# Patient Record
Sex: MALE | Race: WHITE
[De-identification: names, ages, dates, MRNs, and addresses within clinical notes are randomized per-mention and may not be internally consistent; named-entity substitution may affect disease eponyms.]

---

## 2017-11-03 ENCOUNTER — HOSPITAL ENCOUNTER (EMERGENCY)
Dept: HOSPITAL 1 - ED | Age: 47
Discharge: HOME | End: 2017-11-03
Payer: COMMERCIAL

## 2017-11-03 VITALS — SYSTOLIC BLOOD PRESSURE: 138 MMHG | DIASTOLIC BLOOD PRESSURE: 66 MMHG

## 2017-11-03 VITALS — HEIGHT: 66 IN | WEIGHT: 202.98 LBS | BODY MASS INDEX: 32.62 KG/M2

## 2017-11-03 DIAGNOSIS — Z71.6: ICD-10-CM

## 2017-11-03 DIAGNOSIS — N18.6: ICD-10-CM

## 2017-11-03 DIAGNOSIS — B34.9: Primary | ICD-10-CM

## 2017-11-03 DIAGNOSIS — E87.5: ICD-10-CM

## 2017-11-03 DIAGNOSIS — Z88.1: ICD-10-CM

## 2017-11-03 DIAGNOSIS — F17.210: ICD-10-CM

## 2017-11-03 DIAGNOSIS — D64.9: ICD-10-CM

## 2017-11-03 DIAGNOSIS — I12.0: ICD-10-CM

## 2017-11-03 LAB
ALBUMIN SERPL-MCNC: 4 G/DL (ref 3.4–5)
ALP SERPL-CCNC: 117 U/L (ref 46–116)
ALT SERPL-CCNC: 23 U/L (ref 16–63)
AST SERPL-CCNC: 9 U/L (ref 15–37)
BASOPHILS NFR BLD: 0.4 % (ref 0–2)
BILIRUB SERPL-MCNC: 0.7 MG/DL (ref 0.2–1)
BUN SERPL-MCNC: 75 MG/DL (ref 7–18)
CALCIUM SERPL-MCNC: 8.5 MG/DL (ref 8.5–10.1)
CHLORIDE SERPL-SCNC: 90 MMOL/L (ref 98–107)
CO2 SERPL-SCNC: 19.1 MMOL/L (ref 21–32)
CREAT SERPL-MCNC: 16.6 MG/DL (ref 0.7–1.3)
ERYTHROCYTE [DISTWIDTH] IN BLOOD BY AUTOMATED COUNT: 13.4 % (ref 11.5–14.5)
GFR SERPLBLD BASED ON 1.73 SQ M-ARVRAT: 3 ML/MIN
GLUCOSE SERPL-MCNC: 91 MG/DL (ref 74–106)
PLATELET # BLD: 192 X10^3MCL (ref 130–400)
POTASSIUM SERPL-SCNC: 5.8 MMOL/L (ref 3.5–5.1)
PROT SERPL-MCNC: 9 G/DL (ref 6.4–8.2)
SODIUM SERPL-SCNC: 129 MMOL/L (ref 136–145)

## 2019-03-29 ENCOUNTER — HOSPITAL ENCOUNTER (EMERGENCY)
Dept: HOSPITAL 1 - ED | Age: 49
Discharge: HOME | End: 2019-03-29
Payer: COMMERCIAL

## 2019-03-29 VITALS
WEIGHT: 199 LBS | BODY MASS INDEX: 31.23 KG/M2 | WEIGHT: 199 LBS | BODY MASS INDEX: 31.23 KG/M2 | HEIGHT: 67 IN | HEIGHT: 67 IN

## 2019-03-29 VITALS — DIASTOLIC BLOOD PRESSURE: 93 MMHG | SYSTOLIC BLOOD PRESSURE: 158 MMHG

## 2019-03-29 DIAGNOSIS — J06.9: Primary | ICD-10-CM

## 2019-03-29 DIAGNOSIS — Z88.1: ICD-10-CM

## 2019-03-29 DIAGNOSIS — J40: ICD-10-CM

## 2019-03-29 DIAGNOSIS — N18.9: ICD-10-CM

## 2019-03-29 DIAGNOSIS — I12.9: ICD-10-CM

## 2019-03-30 ENCOUNTER — HOSPITAL ENCOUNTER (INPATIENT)
Dept: HOSPITAL 1 - ED | Age: 49
LOS: 11 days | Discharge: HOME HEALTH | DRG: 720 | End: 2019-04-10
Attending: FAMILY MEDICINE | Admitting: FAMILY MEDICINE
Payer: COMMERCIAL

## 2019-03-30 VITALS
HEIGHT: 66 IN | HEIGHT: 66 IN | BODY MASS INDEX: 31.53 KG/M2 | BODY MASS INDEX: 31.53 KG/M2 | WEIGHT: 196.21 LBS | WEIGHT: 196.21 LBS

## 2019-03-30 DIAGNOSIS — N18.6: ICD-10-CM

## 2019-03-30 DIAGNOSIS — E83.39: ICD-10-CM

## 2019-03-30 DIAGNOSIS — Z83.3: ICD-10-CM

## 2019-03-30 DIAGNOSIS — Z80.3: ICD-10-CM

## 2019-03-30 DIAGNOSIS — E87.5: ICD-10-CM

## 2019-03-30 DIAGNOSIS — A08.4: ICD-10-CM

## 2019-03-30 DIAGNOSIS — Z82.3: ICD-10-CM

## 2019-03-30 DIAGNOSIS — Z79.899: ICD-10-CM

## 2019-03-30 DIAGNOSIS — I13.11: ICD-10-CM

## 2019-03-30 DIAGNOSIS — E87.1: ICD-10-CM

## 2019-03-30 DIAGNOSIS — A41.81: Primary | ICD-10-CM

## 2019-03-30 DIAGNOSIS — D63.1: ICD-10-CM

## 2019-03-30 DIAGNOSIS — N17.0: ICD-10-CM

## 2019-03-30 DIAGNOSIS — I12.0: ICD-10-CM

## 2019-03-30 DIAGNOSIS — E87.8: ICD-10-CM

## 2019-03-30 DIAGNOSIS — Z99.2: ICD-10-CM

## 2019-03-30 DIAGNOSIS — J20.8: ICD-10-CM

## 2019-03-30 DIAGNOSIS — Z88.8: ICD-10-CM

## 2019-03-30 DIAGNOSIS — I16.0: ICD-10-CM

## 2019-03-30 LAB
ALBUMIN SERPL-MCNC: 3.3 G/DL (ref 3.4–5)
ALP SERPL-CCNC: 95 U/L (ref 46–116)
ALT SERPL-CCNC: 34 U/L (ref 16–63)
AST SERPL-CCNC: 20 U/L (ref 15–37)
BASOPHILS NFR BLD: 1.8 % (ref 0–2)
BILIRUB SERPL-MCNC: 0.48 MG/DL (ref 0.2–1)
BUN SERPL-MCNC: 65 MG/DL (ref 7–18)
CALCIUM SERPL-MCNC: 8.9 MG/DL (ref 8.5–10.1)
CHLORIDE SERPL-SCNC: 95 MMOL/L (ref 98–107)
CO2 SERPL-SCNC: 28.6 MMOL/L (ref 21–32)
CREAT SERPL-MCNC: 14.2 MG/DL (ref 0.7–1.3)
ERYTHROCYTE [DISTWIDTH] IN BLOOD BY AUTOMATED COUNT: 15.2 % (ref 11.5–14.5)
GFR SERPLBLD BASED ON 1.73 SQ M-ARVRAT: 4 ML/MIN
GLUCOSE SERPL-MCNC: 106 MG/DL (ref 74–106)
PLATELET # BLD: 186 X10^3MCL (ref 130–400)
POTASSIUM SERPL-SCNC: 5 MMOL/L (ref 3.5–5.1)
PROT SERPL-MCNC: 7.5 G/DL (ref 6.4–8.2)
SODIUM SERPL-SCNC: 134 MMOL/L (ref 136–145)

## 2019-03-30 PROCEDURE — P9016 RBC LEUKOCYTES REDUCED: HCPCS

## 2019-03-30 NOTE — NUR
PT AWAKE AND ALERT. PT C/O COUGH, FEVER, N/V/D. PT ON FULL CM. NAD. RESP E/U.
PT HAS HX OF HTN, AND KIDNEY FAILURE. MSE COMPLETED BY DR BAILEY

## 2019-03-31 VITALS — SYSTOLIC BLOOD PRESSURE: 140 MMHG | DIASTOLIC BLOOD PRESSURE: 75 MMHG

## 2019-03-31 VITALS — DIASTOLIC BLOOD PRESSURE: 78 MMHG | SYSTOLIC BLOOD PRESSURE: 124 MMHG

## 2019-03-31 VITALS — DIASTOLIC BLOOD PRESSURE: 94 MMHG | SYSTOLIC BLOOD PRESSURE: 157 MMHG

## 2019-03-31 VITALS — DIASTOLIC BLOOD PRESSURE: 98 MMHG | SYSTOLIC BLOOD PRESSURE: 157 MMHG

## 2019-03-31 VITALS — SYSTOLIC BLOOD PRESSURE: 149 MMHG | DIASTOLIC BLOOD PRESSURE: 72 MMHG

## 2019-03-31 VITALS — DIASTOLIC BLOOD PRESSURE: 71 MMHG | SYSTOLIC BLOOD PRESSURE: 112 MMHG

## 2019-03-31 VITALS — SYSTOLIC BLOOD PRESSURE: 151 MMHG | DIASTOLIC BLOOD PRESSURE: 90 MMHG

## 2019-03-31 LAB
AMYLASE SERPL-CCNC: 144 U/L (ref 25–115)
BASOPHILS NFR BLD: 0 % (ref 0–2)
BUN SERPL-MCNC: 72 MG/DL (ref 7–18)
CALCIUM SERPL-MCNC: 8.6 MG/DL (ref 8.5–10.1)
CHLORIDE SERPL-SCNC: 95 MMOL/L (ref 98–107)
CHOLEST SERPL-MCNC: 140 MG/DL (ref ?–200)
CHOLEST/HDLC SERPL: 3 MG/DL
CO2 SERPL-SCNC: 26.2 MMOL/L (ref 21–32)
CREAT SERPL-MCNC: 15.9 MG/DL (ref 0.7–1.3)
ERYTHROCYTE [DISTWIDTH] IN BLOOD BY AUTOMATED COUNT: 15.4 % (ref 11.5–14.5)
GFR SERPLBLD BASED ON 1.73 SQ M-ARVRAT: 4 ML/MIN
GLUCOSE SERPL-MCNC: 89 MG/DL (ref 74–106)
HDLC SERPL-MCNC: 47 MG/DL (ref 40–60)
LIPASE SERPL-CCNC: 319 IU/L (ref 73–393)
MAGNESIUM SERPL-MCNC: 2 MG/DL (ref 1.8–2.4)
MAGNESIUM SERPL-MCNC: 2.1 MG/DL (ref 1.8–2.4)
MONOCYTES NFR BLD: 5 % (ref 0–7)
NEUTS BAND NFR BLD: 2 % (ref 0–10)
NEUTS SEG NFR BLD MANUAL: 93 % (ref 37–75)
PHOSPHATE SERPL-MCNC: 4.7 MG/DL (ref 2.5–4.9)
PHOSPHATE SERPL-MCNC: 6.9 MG/DL (ref 2.5–4.9)
PLAT MORPH BLD: (no result)
PLATELET # BLD: 153 X10^3MCL (ref 130–400)
POTASSIUM SERPL-SCNC: 5.6 MMOL/L (ref 3.5–5.1)
RBC MORPH BLD: (no result)
SODIUM SERPL-SCNC: 134 MMOL/L (ref 136–145)
T3 SERPL-MCNC: 0.6 NG/ML
T3RU NFR SERPL: 33 % UPTAKE (ref 33–39)
T4 FREE SERPL-MCNC: 1.13 NG/DL (ref 0.76–1.46)
T4 SERPL-MCNC: 9.4 UG/DL (ref 4.7–13.3)
T4/T3 UPTAKE INDEX SERPL: 3.1 UG/DL (ref 1.4–4.5)
TRIGL SERPL-MCNC: 87 MG/DL (ref ?–150)

## 2019-03-31 PROCEDURE — 5A1D70Z PERFORMANCE OF URINARY FILTRATION, INTERMITTENT, LESS THAN 6 HOURS PER DAY: ICD-10-PCS | Performed by: FAMILY MEDICINE

## 2019-03-31 NOTE — NUR
PT REMAINS ASLEEP AND EASILY AROUSABLE. HE HAD NO C/O PAIN. NO EPISODE OF N/V.
NO BM NOTED . HL TO RTAC INTACT.

## 2019-03-31 NOTE — NUR
PT DONE WITH HEMODIALYSIS. 2500CC OUT. DRESSING TO L ARM CDI. VS T 99.7, BP
132/77, , RR 20. WILL CONTINUE TO MONITOR.

## 2019-03-31 NOTE — NUR
PAGE GATED THERSIDEN FOR COUGH MEDICAITON. PATIENT HAS BEEN COUGHING JUUP
BLOOD SUPTUM BUT THE LABS REFUSED THE SPECIMEN SENT AND THERE IS TOO MUCH
SALIVA. WILL ATTEMPT AGAIN AND OFFERED THE PATIENT ANOTHER SPECIMEN CUP.

## 2019-03-31 NOTE — NUR
SEEN BY DR BENOIT AND ADVISED OF NEEDING A SPUTUM AND THAT CLIFTON CULTURES ARE
STILL NOT CONCLUSIVE. LAB CALLED WITH RESULT AND CALLED THE RESIDENT WIT THE
RSULTS. SENSITIVITY PENDING AT THIS TIME. NTOE DTHE PATEINT HAS BEEN HAVING
FEVER AND THE INFLUENZA CAME BACK NEGATIVE AT THIS TIME. FAMILY AT BEDSIDE AND
ATTENTIVE WT[facility] CARE.

## 2019-03-31 NOTE — NUR
PATIENT HAS BEEN WITH SATURATION AT 92 PERCENT AND PLACED ON THREE LITERS
HE STATES HE HAS A BAD HEAD ACHE. OFFERE NORCO AS THE TYLNOEL AS NOT
EFECTIVE. WITH CONTINUE TO MONITOR.

## 2019-03-31 NOTE — NUR
PATEINT GIVEN TYLENOL AND THE TAMIFLU AS INDICATED.PATEINT HAD A TEMPERATURE.
WILL MONITO FOR THE EFFECTIVENESS OF THE MEDICATION.

## 2019-03-31 NOTE — NUR
PATIEN TRECEIVED FROM PREVIOUS SHIFT ALERT AND ORIENTD BUT APPEARS VERY TIRED
AND WITH GENERAL WEAKNESS. PATIENT HAS A AV SHUNT TO [facility] LEFT FOREARM AND
DRESSING INTACT AND DRY. LAST HD WAS ON THURDAY THIS WEEK. PATIENT WITH A DRY
COUGH AND HAS BEEN ON BEDREST AND HAS BEEN ABLE TO TOERATE THE DIET BUT HAS A
POOR APPEATITE DUE TO HIS PRESENT ILLNESS. PATIENT STATES HE DOES HD IN HIS
HOME AND TO HIMSELF. HE DOES NOT DO PARATINEAL BUT THE HEMO DIALYSIS AND HAS A
MACHINE AT HOME. PATIENT HAS HAD DIARRHEA AND REFUSED THE COLACE THIS AM.
PATIENT CHEST XRAYSHOWS PATEIT WITH PULMONARY VASCULAR CONGSTIO AND HE HAS
BEEN ON AND OFF NAUSEA AND RECEIVED ZOFRAN AS INDICATED. VITALS AT THIS AM AT
98.3, 87, 18, 151/90, 97%. NOTED LABS ARE THE HEMOGLOBIN AT 8.8, WBC AS T12.4,
AND THE NEUTRAPHILS AT 89.1. PATIENT WITH HISTORY OF HTN AND RENAL FAILURE AND
HAS BEE ON FULL LIGUID DIET BUT STATES HE HAS HAD A POOR DIET INTAKE. LUNGA
ARE WITH SOME RALES AND HE HAS DIMINIHSED BREATH SOUNDS. BOWEL SOUNDS ACTIVE
AND RECIEVED ZOFRAN LAST SHIFT FOR COMPLAINS OF NAUSEA AND VOMITIING. DENIES
ANY ACUTE PAIN AT THIS TIME. WILL CONTINUE TO MONITOR AS INDICATED.

## 2019-04-01 VITALS — SYSTOLIC BLOOD PRESSURE: 111 MMHG | DIASTOLIC BLOOD PRESSURE: 57 MMHG

## 2019-04-01 VITALS — SYSTOLIC BLOOD PRESSURE: 120 MMHG | DIASTOLIC BLOOD PRESSURE: 65 MMHG

## 2019-04-01 VITALS — SYSTOLIC BLOOD PRESSURE: 105 MMHG | DIASTOLIC BLOOD PRESSURE: 66 MMHG

## 2019-04-01 VITALS — DIASTOLIC BLOOD PRESSURE: 70 MMHG | SYSTOLIC BLOOD PRESSURE: 128 MMHG

## 2019-04-01 LAB
BASOPHILS NFR BLD: 0.3 % (ref 0–2)
BUN SERPL-MCNC: 46 MG/DL (ref 7–18)
CALCIUM SERPL-MCNC: 8.9 MG/DL (ref 8.5–10.1)
CHLORIDE SERPL-SCNC: 98 MMOL/L (ref 98–107)
CO2 SERPL-SCNC: 26.8 MMOL/L (ref 21–32)
CREAT SERPL-MCNC: 11.3 MG/DL (ref 0.7–1.3)
ERYTHROCYTE [DISTWIDTH] IN BLOOD BY AUTOMATED COUNT: 15.2 % (ref 11.5–14.5)
GFR SERPLBLD BASED ON 1.73 SQ M-ARVRAT: 5 ML/MIN
GLUCOSE SERPL-MCNC: 103 MG/DL (ref 74–106)
IRON SERPL-MCNC: 24 UG/DL (ref 65–170)
MAGNESIUM SERPL-MCNC: 2 MG/DL (ref 1.8–2.4)
PHOSPHATE SERPL-MCNC: 6.6 MG/DL (ref 2.5–4.9)
PLATELET # BLD: 155 X10^3MCL (ref 130–400)
POTASSIUM SERPL-SCNC: 5.8 MMOL/L (ref 3.5–5.1)
SODIUM SERPL-SCNC: 135 MMOL/L (ref 136–145)
TIBC SERPL-MCNC: 169 UG/DL (ref 250–450)

## 2019-04-01 PROCEDURE — 5A1D70Z PERFORMANCE OF URINARY FILTRATION, INTERMITTENT, LESS THAN 6 HOURS PER DAY: ICD-10-PCS | Performed by: FAMILY MEDICINE

## 2019-04-01 NOTE — NUR
DUE MEDS GIVEN. B/P MED HYDRALAZINE HELD DUE TO PT REFUSAL AND SBP IN THE
LOWER RANGE . DAUGHTER AT BEDSIDE. CALL LIGHT WITHIN REACH.

## 2019-04-01 NOTE — NUR
PT IS AAOX4. RESP EVEN AND UNLABORED. ON O2 N/C AT 3 LPM. PT HAS SOB UPON
EXERTION. TELE 21 IN PLACE READING NSR. IV CATH TO RAC N/S LOCKED. SITE WNL.
PT DENIES PAIN OR DISCOMFORT. CALL LIGHT WITHIN REACH. BED IN LOW POSITION.
WILL ENDORSE ALL CARE TO NOC RN.

## 2019-04-01 NOTE — NUR
REPORTED TO DR. MCKENZIE PT'S K+ =5.8. RECEIVED ORDER TO D/C DIET, NEW DIET
ORDER FULL LIQUID RENAL DIET. ORDERS NOTED AND CARRIED OUT.

## 2019-04-01 NOTE — NUR
PT AGREED TO TAKE B/P MED METOPROLOL TO MAINTAIN HEART RATE. ALL OTHER MEDS
NIFEDIPINE, BENAZAPRIL, LOSARTAN, HYDRALAZINE WILL BE HELD. COLACE HELD DUE TO
REPORTS OF LOOSE STOOLS. B/P 11/57, HR 97. NO DISTREESS NOTED. CALL LIGHT
WITHIN REACH.

## 2019-04-01 NOTE — NUR
DUE MEDS GIVEN. PT REFUSED TO TAKE ALL B/P MEDS. STATES HE IS JUST POST
DIALYSIS LAST NIGHT (3/31/19) AND STATE HE WILL BE DIZZY ALL DAY IF HE TAKES
HIS B/P MEDS TODAY. PT REFUSED PAIN OR DISCOMFORT. RESP EVEN AND UNLABORED. NO
DISTRESS NOTED. CALL LIGHT WITHIN REACH.

## 2019-04-01 NOTE — NUR
RECEIVED PT LAYING IN BED, NO ACUTE DISTRESS OBSERVED. DENIES PAIN OR
DISCOMFORT AT THIS TIME. AA/OX4, ABLE TO MAKE NEEDS KNOWN, SPEECH CLEAR AND
APPROPRIATE. SINUS TACH TO TELE #21, , DENIES CP. PULSES PRESENT AND
EQUAL THROUGHOUT, NO EDEMA NOTED. BREATHING ON 3LNC, EVEN AND UNLABORED,
DENIES SOB OR DYSPNEA AT THIS TIME, SOB WITH EXERTION, LUNGS DIM TO BASES, O2
SAT 93% ABD ROUND AND SOFT, NONTENDER, WITH ACTIVE BOWEL SOUNDS, DENIES N/V/D.
ANURIC, HD EARLIER TODAY WITH 2L OUT, AV SHUNT TO LFA, DRESSING CDI.
GENERALIZED WEAKNESS NOTED, AMBULATORY AND ABLE TO REPOSITION SELF IN BED.
SKIN CDI. IV TO RAC LEAKING, WILL START NEW IV SITE, CASE MANAGEMENT AT
BEDSIDE TO SEE PT AT THIS TIME. COMFORT AND SAFETY MEASURES IN PLACE. ALL
NEEDS ASSESSED AND ATTENDED TO. CALL LIGHT WITHIN REACH. WILL CONTINUE TO
MONITOR

## 2019-04-01 NOTE — NUR
PT IS AAOX4. DENIES H/A OF DIZZINESS. TELE 21 IN PLACE READING NSR. RESP EVEN
AND UNLABORED. PT NOTED WITH FINE CRACKLES BILATERAL UPPER LOBES. ON O2 N/C AT
3LPM. PT NOTED WITH NONPRODUCTIVE COUGH. ABDOMEN SOFT, NONTENDER,
NONDISTENDED. BOWEL SOUNDS ACTIVE. SKIN CDI. PT HA AV FISTULA TO LFA, BRUIT
AND THRILL PRESENT. IV CATH TO RAC N/S LOCKED. CATH FLUSHED AND PATENT, NO S/S
OF INFECTION OR INFILTRATION AT SITE. PT DENIES PAIN OR DISCOMFORT AT THIS
TIME. CALL LIGHT WITHIN REACH. BED IN LOW POSITION.

## 2019-04-01 NOTE — NUR
PT RESTING IN BED. NO PAIN NOTED THROUGHOUT SHIFT. FREQUENT CHECKS MADE ON PT
TO ASSURE NC IS IN PLACE. BREATHING SHALLOW NUT PT STATES NO SOB. PT REMAINS
ON NC AT 3L. PT WAS GIVEN TYLENOL FOR TEMP X 1. TEMP NOW 97.7. IV SITE TO R AC
INTACT, SALINE LOCK. ALL NEEDS MET AND ANTICIPATED. BED IN LOW POSITION. CALL
LIGHT WITHIN REACH. WILL ENDORSE TO ONCOMING RN.

## 2019-04-02 VITALS — DIASTOLIC BLOOD PRESSURE: 62 MMHG | SYSTOLIC BLOOD PRESSURE: 135 MMHG

## 2019-04-02 VITALS — DIASTOLIC BLOOD PRESSURE: 84 MMHG | SYSTOLIC BLOOD PRESSURE: 149 MMHG

## 2019-04-02 VITALS — DIASTOLIC BLOOD PRESSURE: 77 MMHG | SYSTOLIC BLOOD PRESSURE: 145 MMHG

## 2019-04-02 VITALS — SYSTOLIC BLOOD PRESSURE: 159 MMHG | DIASTOLIC BLOOD PRESSURE: 85 MMHG

## 2019-04-02 VITALS — SYSTOLIC BLOOD PRESSURE: 135 MMHG | DIASTOLIC BLOOD PRESSURE: 62 MMHG

## 2019-04-02 VITALS — SYSTOLIC BLOOD PRESSURE: 121 MMHG | DIASTOLIC BLOOD PRESSURE: 65 MMHG

## 2019-04-02 LAB
BUN SERPL-MCNC: 32 MG/DL (ref 7–18)
CALCIUM SERPL-MCNC: 9.1 MG/DL (ref 8.5–10.1)
CHLORIDE SERPL-SCNC: 97 MMOL/L (ref 98–107)
CO2 SERPL-SCNC: 30.8 MMOL/L (ref 21–32)
CREAT SERPL-MCNC: 8.9 MG/DL (ref 0.7–1.3)
GFR SERPLBLD BASED ON 1.73 SQ M-ARVRAT: 7 ML/MIN
GLUCOSE SERPL-MCNC: 91 MG/DL (ref 74–106)
POTASSIUM SERPL-SCNC: 3.8 MMOL/L (ref 3.5–5.1)
SODIUM SERPL-SCNC: 136 MMOL/L (ref 136–145)

## 2019-04-02 NOTE — NUR
PT'S ORAL TEMP 100.4, COOLING MEASURES INITIATED, BLANKETS OFF, ICE
PACKS APPLIED, AC ON. TYLENOL PRN PO GIVEN PER EMAR.

## 2019-04-02 NOTE — NUR
TEMP REASSESSED, 99.0. NO OTHER SIGNIFICANT CHANGES TO REPORT. PT COMPLIED
WITH NURSING CARE THROUGHOUT THE SHIFT. NO DISTRESS OBSERVED AT THIS TIME, PT
LAYING IN BED, BREATHING EVEN AND UNLABORED. COMFORT AND SAFETY MEASURES
MAINTAINED. ALL NEEDS ASSESSED AND ATTENDED TO. CALL LIGHT WITHIN REACH. WILL
CONTINUE TO MONITOR AND ENDORSE CARE TO DAY SHIFT NURSE

## 2019-04-02 NOTE — NUR
SEEN THE PT AND SPOKE WITH HIM. AWARE ABOUT CHEST XRAY RESULT, SHE
SAID SHE WILL ORDER CT CHEST.  AWARE ABOUT PT IS COUGHING OUT
BLOOD TINGED SPUTUM. PT ON RT PROTOCOLE.

## 2019-04-02 NOTE — NUR
PT RESTING IN BED COMFORTABLY. DENIES PAIN THIS TIME. STABLE. STILL COUGHING
BLOOD TINGED SPUTUM. DOCTORS AWARE. GAVE REPORT TO NIGHT SHIFT NURSE.

## 2019-04-02 NOTE — NUR
REMINDED PT MULTIPLE TIME THORUGH OUT THE SHIFT ABOUT RECOLLECTING THE SPUTUM.
PT AGREED BUT NOT GIVING SAMPLE. WILL ENDORSE TO NIGHT SHIFT.

## 2019-04-02 NOTE — NUR
DR. FELIZ AT BEDSIDE TO SEE PT. PER DR. FELIZ, CONTINUE ORDERED ABX, MAKE
SURE IV ABX ARE NOT DIALYZED OUT WHEN PT UNDERGOES DIALYSIS. DR. FELIZ ALSO
RECOMMENDS JERILYN, STATES HE WILL SPEAK WITH RESIDENT. DR. AUGUSTINE BAIRD AT THIS
TIME, WILL ANTICIPATE CALL BACK. DISCUSSED PLAN OF CARE WITH DR. FELIZ.
UPDATED ON PT'S STATUS

## 2019-04-02 NOTE — NUR
PT IS STABLE, NO SOB NOTED. PT SAID HE DOESNOT LIKE NASAL CANNULA. REMOVE O2
AND CHECKED SPO2 ON RA AND PT SATTING 96% IN RA AND NO SOB. KEEPING PT ON RA.
INSTRUCTED PT IF HE FEEL DIFFUCULTY BREATHING PLEASE INFORM AND CHECKING PT
SPO2 ON ROUTINE. INFORMED PT NEED TO COLLECT SPUTUM AGAIN AND PROVIDED SPUTUM
CUP AND INSTRUCTED HIM HOW TO COLLECT. PT AGREED.

## 2019-04-02 NOTE — NUR
RECEIVED PT LAYING IN BED, NO ACUTE DISTRESS OBSERVED. DENIES PAIN OR
DISCOMFORT AT THIS TIME. AA/OX4, ABLE TO MAKE NEEDS KNOWN, SPEECH CLEAR AND
APPROPRIATE. SINUS TACH TO TELE #21, , DENIES CP. PULSES PRESENT AND
EQUAL THROUGHOUT, NO EDEMA NOTED. HEMOPTYSIS WITH BLOOD TINGED SPUTUM,
PRODUCTIVE COUGH. BREATHING ON RA, EVEN AND UNLABORED, DENIES SOB OR DYSPNEA
AT THIS TIME, SOB WITH EXERTION, CRACKLES TO UPPER LOBES, LUNGS DIM TO BASES,
O2 SAT 96% ABD ROUND AND SOFT, NONTENDER, WITH ACTIVE BOWEL SOUNDS, DENIES
N/V/D. ANURIC, HD YESTERDAY WITH 2L OUT, AV SHUNT TO LFA, DRESSING CDI. MILD
WEAKNESS, AMBULATORY AND ABLE TO REPOSITION SELF IN BED. SKIN CDI. IV TO MINDY
IN PLACE, S/L AT THIS TIME, NO PAIN, REDNESS OR SWELLING WHEN FLUSHED WITH NS.
COMFORT AND SAFETY MEASURES IN PLACE. ALL NEEDS ASSESSED AND ATTENDED TO. CALL
LIGHT WITHIN REACH. WILL CONTINUE TO MONITOR

## 2019-04-03 VITALS — SYSTOLIC BLOOD PRESSURE: 173 MMHG | DIASTOLIC BLOOD PRESSURE: 95 MMHG

## 2019-04-03 VITALS — DIASTOLIC BLOOD PRESSURE: 85 MMHG | SYSTOLIC BLOOD PRESSURE: 134 MMHG

## 2019-04-03 VITALS — DIASTOLIC BLOOD PRESSURE: 92 MMHG | SYSTOLIC BLOOD PRESSURE: 152 MMHG

## 2019-04-03 VITALS — SYSTOLIC BLOOD PRESSURE: 138 MMHG | DIASTOLIC BLOOD PRESSURE: 87 MMHG

## 2019-04-03 LAB
BASOPHILS NFR BLD: 0.2 % (ref 0–2)
BUN SERPL-MCNC: 47 MG/DL (ref 7–18)
CALCIUM SERPL-MCNC: 9.3 MG/DL (ref 8.5–10.1)
CHLORIDE SERPL-SCNC: 97 MMOL/L (ref 98–107)
CO2 SERPL-SCNC: 28.6 MMOL/L (ref 21–32)
CREAT SERPL-MCNC: 12 MG/DL (ref 0.7–1.3)
ERYTHROCYTE [DISTWIDTH] IN BLOOD BY AUTOMATED COUNT: 14.7 % (ref 11.5–14.5)
GFR SERPLBLD BASED ON 1.73 SQ M-ARVRAT: 5 ML/MIN
GLUCOSE SERPL-MCNC: 99 MG/DL (ref 74–106)
PLATELET # BLD: 186 X10^3MCL (ref 130–400)
POTASSIUM SERPL-SCNC: 3.9 MMOL/L (ref 3.5–5.1)
SODIUM SERPL-SCNC: 138 MMOL/L (ref 136–145)

## 2019-04-03 PROCEDURE — 5A1D70Z PERFORMANCE OF URINARY FILTRATION, INTERMITTENT, LESS THAN 6 HOURS PER DAY: ICD-10-PCS | Performed by: FAMILY MEDICINE

## 2019-04-03 NOTE — NUR
SHIFT REASEESSMENT DONE.PATIENT ALERT AND ORIENTED.FAMILY AT BEDSIDE
VISITING.AIRBORNE PRECAUTION MAINTAINED.O2 AT 3 L N/C.MILD GEN
WEAKNESS,PHYSICAL THERAPY BUT AMBUTAORY.HEPLOCK MINDY,AND RAC.IVATB AS
SCHEDULED.TELE 21 REMAINS ST.LFA AV SHUNT LFA,HAD HD TODAY 3 LITERS
OUT.PATIENT IS ANURIC.CALL LIGHT IN REACH.

## 2019-04-03 NOTE — NUR
AT 1715 - HEMODIALYSIS COMPLETED. TOTAL OF 3000 ML REMOVED. POST HD /78.
AT 1750 - PATIENT HAS EATEN FULL LIQUID DINNER. EPOGEN 8000 UNITS ADMINSITERED
POST DIALYSIS AS PER EMAR. PATIENT SITTING IN CHAIR. FAMILY AT BEDSIDE.

## 2019-04-03 NOTE — NUR
MONITOR SHOWING SINUS TACH; RATE 120. AFEBRILE. BP WNL. NO C/O PAIN.
NON-PRODUCTIVE COUGH. PATIENT AWARE OF NEED TO COLLECT SPUTUM FOR SPECIMEN.
WILL ENDORSE CARE TO NIGHT NURSE.

## 2019-04-03 NOTE — NUR
RECEIVED PT FROM SHIFT NURSE ASLEEP BUT AROUSABLE. NO ACUTE DISTRESS NOTED.
RESP EVEN AND UNLABORED ON 3L NC.  IV INTACT AND PATENT. BED IN LOW POSITION.
CALL LIGHT WITHIN REACH. WILL CONTINUE TO MONITOR.

## 2019-04-03 NOTE — NUR
AT 1145 - TOOK OVER CARE OF PATIENT. RECEIVED REPORT FROM CHARGE NURSE.
DIALYSIS NURSE IN ROOM PREPARING FOR HD. PATIENT ON AIRBORNE ISOLATION.
AT 1300 - HD NURSE REQUESTED THAT SCHEDULED DOSE OF HYDRALAZINE NOT BE GIVEN
DESPITE /95, BECAUSE OF HD AND PLAN TO REMOVE 3L.
AT 1410 - HD IN PROGRESS. PATIENT SLEEPING. DAUGHTER AT BEDSIDE.

## 2019-04-03 NOTE — NUR
PT'S TEMP REASSESSED, 99.1, COOLING MEASURES REMAIN IN PLACE. NO OTHER
SIGNIFICANT CHANGES TO REPORT, PT COMPLIED WITH NURSING CARE THROUGHOUT THE
SHIFT. PT LAYING IN BED AT THIS TIME, BREATHING EVEN AND UNLABORED, NO ACUTE
DISTRESS OBSERVED. COMFORT AND SAFETY MEASURES MAINTAINED. ALL NEEDS ASSESSED
AND ATTENDED TO. CALL LIGHT WITHIN REACH. WILL CONTINUE TO MONITOR AND ENDORSE
CARE TO DAY SHIFT NURSE

## 2019-04-03 NOTE — NUR
Initial Nutrition Assessment- 210 A RACHEAL ARMENDARIZ MR
 
 Dx: FEVER, SEPSIS
 PMHx: ESRD, HTN ON HOME DIALYSIS
 PSHx: Fistula left arm
 Labs: (4/3) CL 97L, BUN 47H, CREAT 12.0L, (4/1) PHOS 6.6H,
 Meds: Colace, cozaar, lactinex, renagel, Tamiflu, zofran
Diet: Full liquid
PO Intake: 100% breakfast
 Ht: 167.64 cms (66 in) Wt: 88.9 kg (195.5#) BMI:   31.6 kg/m2
IBW: 142# (65 KG) %IBW:  136 UBW: 88 kg
 Age: 48 /M
 Food Allergies: NKFA
 Skin: intact
Harmeet: 21
Edema: none
GI: Last BM 4/1/19
 
Per H&P, Pt is a readmit and has new onset nausea, vomiting, diarrhea. Pt does
his own dialysis every other day with left arm fistula. Pt is ambulatory, does
not use oxygen.
 
Pt visit, Pt was awake and alert and said that he had diarrhea since admission
but is improving, he has poor appetite but has no difficulty swallowing.
 
 Problem with:  N:  no   V: no     D: yes but improving    C: no
 Problems with: Chewing: no Swallowing:  no
 Current appetite: poor
Recent wt change: yes but unknown  %wt change: unknown
Vitamin/Supplement use: Vitamin D
Special diet at home: Tries to follow Renal diet
Physical activity: unknown
Education: patient aware about dietary restrictions in renal diet
 
 Estimated Nutritional Needs Based on Ideal  body weight 65  kg
 Energy: 1950 - 2275 kcal/d (30-35 kcal/kg- HD)
 Protein:  98- 130 g/d (1.5-2.0 g/kg)-maintenance and preservation of lean
body mass/ HD
 Fluid: 1950- 2300 ml/d  (1 ml/kcal-fluid balance) or per doctor
 
Nutrition Diagnosis
1. Inadequate oral intake related to medical condition as evidenced by
self-reported poor PO and patient on full liquid diet since 3/31/19.
 
Intervention
1. Progress to Renal diet when medically appropriate.
 
 
Monitor/Evaluate
 Goal: PO intake at least 75 % of estimated needs
 Monitor: PO intake, Labs, GI function
 F/U in 3-5 days as moderate risk : 4/6-4/8

## 2019-04-03 NOTE — NUR
PATIENT GIVEN PM MEDS,ALSO WANTING TYLENOL BUT HE SAYS IF THERE IS A STRONGER
PAIN MED HE WILL TAKE IT.WILL GIVE NORCO.

## 2019-04-04 VITALS — DIASTOLIC BLOOD PRESSURE: 88 MMHG | SYSTOLIC BLOOD PRESSURE: 136 MMHG

## 2019-04-04 VITALS — DIASTOLIC BLOOD PRESSURE: 72 MMHG | SYSTOLIC BLOOD PRESSURE: 143 MMHG

## 2019-04-04 VITALS — DIASTOLIC BLOOD PRESSURE: 79 MMHG | SYSTOLIC BLOOD PRESSURE: 159 MMHG

## 2019-04-04 VITALS — SYSTOLIC BLOOD PRESSURE: 133 MMHG | DIASTOLIC BLOOD PRESSURE: 79 MMHG

## 2019-04-04 VITALS — DIASTOLIC BLOOD PRESSURE: 69 MMHG | SYSTOLIC BLOOD PRESSURE: 136 MMHG

## 2019-04-04 VITALS — SYSTOLIC BLOOD PRESSURE: 118 MMHG | DIASTOLIC BLOOD PRESSURE: 71 MMHG

## 2019-04-04 VITALS — DIASTOLIC BLOOD PRESSURE: 71 MMHG | SYSTOLIC BLOOD PRESSURE: 131 MMHG

## 2019-04-04 VITALS — SYSTOLIC BLOOD PRESSURE: 117 MMHG | DIASTOLIC BLOOD PRESSURE: 78 MMHG

## 2019-04-04 VITALS — SYSTOLIC BLOOD PRESSURE: 127 MMHG | DIASTOLIC BLOOD PRESSURE: 87 MMHG

## 2019-04-04 VITALS — DIASTOLIC BLOOD PRESSURE: 86 MMHG | SYSTOLIC BLOOD PRESSURE: 139 MMHG

## 2019-04-04 VITALS — DIASTOLIC BLOOD PRESSURE: 82 MMHG | SYSTOLIC BLOOD PRESSURE: 139 MMHG

## 2019-04-04 LAB
BASOPHILS NFR BLD: 0.5 % (ref 0–2)
BASOPHILS NFR BLD: 0.9 % (ref 0–2)
BUN SERPL-MCNC: 31 MG/DL (ref 7–18)
CALCIUM SERPL-MCNC: 9.6 MG/DL (ref 8.5–10.1)
CHLORIDE SERPL-SCNC: 98 MMOL/L (ref 98–107)
CO2 SERPL-SCNC: 28.1 MMOL/L (ref 21–32)
CREAT SERPL-MCNC: 8.3 MG/DL (ref 0.7–1.3)
ERYTHROCYTE [DISTWIDTH] IN BLOOD BY AUTOMATED COUNT: 15.5 % (ref 11.5–14.5)
ERYTHROCYTE [DISTWIDTH] IN BLOOD BY AUTOMATED COUNT: 15.6 % (ref 11.5–14.5)
GFR SERPLBLD BASED ON 1.73 SQ M-ARVRAT: 7 ML/MIN
GLUCOSE SERPL-MCNC: 95 MG/DL (ref 74–106)
PLATELET # BLD: 236 X10^3MCL (ref 130–400)
PLATELET # BLD: 271 X10^3MCL (ref 130–400)
POTASSIUM SERPL-SCNC: 3.9 MMOL/L (ref 3.5–5.1)
RBC MORPH BLD: (no result)
SODIUM SERPL-SCNC: 136 MMOL/L (ref 136–145)

## 2019-04-04 PROCEDURE — 30233N1 TRANSFUSION OF NONAUTOLOGOUS RED BLOOD CELLS INTO PERIPHERAL VEIN, PERCUTANEOUS APPROACH: ICD-10-PCS | Performed by: FAMILY MEDICINE

## 2019-04-04 NOTE — NUR
RECEIVED PATIENT FROM NIGHT NURSE. AWAKE, ALERT AND ORIENTED TO PERSON, PLACE,
TIME AND SITUATION. MONITOR SHOWING SINUS TACH; RATE 112. RESPIRATIONS
REGULAR. ON O2 VIA NC AT 3L/MIN. IV SALINE LOCKED. PATIENT HAD RECEIVED
DIALYSIS YESTERDAY. NPO FOR POSSIBLE PROCEDURE.

## 2019-04-04 NOTE — NUR
AT 0753 - RECEIVED CALL FROM LAB WITH H/H OF 6.8/20.
AT 0757 - CALLED NP CAIT TO NOTIFY OF H/H RESULT BUT SHE SAID THAT SHE WAS
UNABLE TO TAKE CALL AT THAT TIME.

## 2019-04-04 NOTE — NUR
BLOOD TRANSFUSION COMPLETED. NO ADVERSE AFFECTS NOTED. VSS. AFEBRILE. BP
133/79  O2 SAT 93% ON 3L VIA NC. PATIENT TOLERATING RENAL DIET. GOOD APPETITE.
WILL ENDORSE CARE TO NIGHT NURSE.

## 2019-04-04 NOTE — NUR
DR GARCIA ON UNIT. RECEIVED ORDERS TO CONSENT PATIENT FOR TRANSESOPHAGEAL
ECHOCARDIOGRAM UNDER MODERATE SEDATION. HE WILL DO PROCEDURE AT BEDSIDE BEFORE
1100 TODAY.
RYAN OJEDA FROM OR ON UNIT. SHE SPOKE WITH NURSING SUPERVISOR AND WILL SPEAK
WITH DR GARCIA. ECHOCARDIOGRAM TECH AWARE OF PLANNED PROCEDURE.

## 2019-04-04 NOTE — NUR
PER CHARGE NURSE AFUA FELIZ WAS HERE,SAYS TO DC AIRBORNE
ISOLATION.PATIENT NOW NPO X MED.DR BRADSHAW ORDERED.

## 2019-04-04 NOTE — NUR
JERILYN COMPLETED. PATIENT DROWSY BUT EASILY ROUNSED. ORIENTED. /83. 
SINUS RHYTHM. O2 SAT 91% ON 6L. PATIENT PLACED ON SIMPLE MASK BY OR RYAN MAR.
O2 SAT NOW 98% ON 6L VIA MASK

## 2019-04-04 NOTE — NUR
AT 0945 - PATIENT SIGNED PROCEDURE CONSENT FOR JERILYN AND ANAESTHESIA CONSNET FOR
MODERATE SEDATION. PATIENT SAYS THAT HE HAS HAD THIS PROCEDURE PREVIOUSLY.
AT 0955 - GENTAMICIN NOW IN PROGRESS.
AT 1015 - PATIENT SIGNED CONSNET FOR BLOOD TRANSFUSION.
ECHOTECH, RT AND OR RN AT BEDSIDE IN PREPARATION FOR PROCEDURE.
AT 1027 - DR GARCIA AT BEDSIDE. SPOKE WITH PATIENT AND PATIENT'S DAUGHTER.
TIME OUT DONE PRIOR TO PRECEDURE.
AT 1040 - TRANSESOPHAGEAL ECHOCARDIOGRAM NOW IN PROGRESS UNDER MODERATE
SEDATION.

## 2019-04-04 NOTE — NUR
SEEN BY DR GIL. PLAN FOR BRONCHOSCOPY TOMORROW.  SPOKE WITH PATIENT AND
FAMILY AND ANSWERED THEIR QUESTIONS.

## 2019-04-04 NOTE — NUR
SHIFT REASSESSMENT DONE.PATIENT ALERT AND ORIENTED,3 FAMILY MEMBERS AT
BEDSIDE,VERY SUPPORTIVE OF CARE.O2 AT 3 LITERS.COUGHING/WILL GIVE COUGH SYRUP
TONIGHT.PATIET IS AMBULATORY WITH GEN WEAKNESS.IV SITE RAC,RUPPER ARM
HEPLOCK,ALL PATENT.WILL GIVE ATB TONIGHT AS SCHEDULED.TELE 21 REMAINS ST.NO
CHEST PAIN.NEOSPORIN TO LFA AV SHUNT APPLIED THIS AM SOME IRRITATION.PATIENT
IS ANURIC,HD IN AM FRIDAY,LAST HD 4/3/19 WITH 3 LITERS OUT.PATIENT DOES HIS
OWN HD AT HOME AS PER REPORT.CALL LIGHT IN REACH.

## 2019-04-04 NOTE — NUR
SPOKE WITH NP CAIT. NOTIFIED OF H/H RESULT OF 6.8/20. ALSO UPDATE ON DR FELIZ  RECOMMENDATION FOR JERILYN. PATIENT IS NPO. SHE IS CONTACTING DR GARCIA.

## 2019-04-05 VITALS — DIASTOLIC BLOOD PRESSURE: 63 MMHG | SYSTOLIC BLOOD PRESSURE: 117 MMHG

## 2019-04-05 VITALS — DIASTOLIC BLOOD PRESSURE: 76 MMHG | SYSTOLIC BLOOD PRESSURE: 138 MMHG

## 2019-04-05 VITALS — SYSTOLIC BLOOD PRESSURE: 144 MMHG | DIASTOLIC BLOOD PRESSURE: 100 MMHG

## 2019-04-05 VITALS — SYSTOLIC BLOOD PRESSURE: 137 MMHG | DIASTOLIC BLOOD PRESSURE: 77 MMHG

## 2019-04-05 VITALS — SYSTOLIC BLOOD PRESSURE: 105 MMHG | DIASTOLIC BLOOD PRESSURE: 65 MMHG

## 2019-04-05 VITALS — SYSTOLIC BLOOD PRESSURE: 131 MMHG | DIASTOLIC BLOOD PRESSURE: 93 MMHG

## 2019-04-05 VITALS — SYSTOLIC BLOOD PRESSURE: 117 MMHG | DIASTOLIC BLOOD PRESSURE: 63 MMHG

## 2019-04-05 PROCEDURE — 0B9J7ZX DRAINAGE OF LEFT LOWER LUNG LOBE, VIA NATURAL OR ARTIFICIAL OPENING, DIAGNOSTIC: ICD-10-PCS | Performed by: INTERNAL MEDICINE

## 2019-04-05 PROCEDURE — 0B9H7ZX DRAINAGE OF LUNG LINGULA, VIA NATURAL OR ARTIFICIAL OPENING, DIAGNOSTIC: ICD-10-PCS | Performed by: INTERNAL MEDICINE

## 2019-04-05 PROCEDURE — 0B9G7ZX DRAINAGE OF LEFT UPPER LUNG LOBE, VIA NATURAL OR ARTIFICIAL OPENING, DIAGNOSTIC: ICD-10-PCS | Performed by: INTERNAL MEDICINE

## 2019-04-05 PROCEDURE — 5A1D70Z PERFORMANCE OF URINARY FILTRATION, INTERMITTENT, LESS THAN 6 HOURS PER DAY: ICD-10-PCS | Performed by: FAMILY MEDICINE

## 2019-04-05 NOTE — NUR
PROCRIT ADMINISTERED AS PER EMAR. PATIENT RESTING  QUIETLY. DAUGHTER AT
BEDSIDE. RESPIRATIONS REGULAR. ON ROOM AIR. MONITOR SHOWING SINUS RHYTHM; RATE
96. EATING WELL. WILL ENDORSE CARE TO NIGHT NURSE.

## 2019-04-05 NOTE — NUR
AT 0730 - RECEIVED PATIENT FROM NIGHT NURSE. SLEEPING. RESPIRATIONS REGULAR.
ON O2 VIA NC AT 3L/MIN. MONITOR SHOWING SINUS TACH; RATE 112.  NPO FOR
BRONCHOSCOPY.
AT 0745 - REPORT GIVEN TO OR NURSE. PROCEDURE SCHEDULED FOR 0930.
AT 0800 - PATIENT IS AWAKE, ALERT AND ORIENTED. SPOKE WITH HIM ABOUT PLAN OF
CARE AND EXPECTATIONS.  CHLORHEXIDINE BOY WIPES PREP DONE BY PATIENT.
AT 0830 - SEEN BY NP CAIT.

## 2019-04-05 NOTE — NUR
AOX4. TELE #21, SR. LUNGS DIMINISHED ON NC @ 2L. DENIES COUGH. PULSES
PALPABLE. NO EDEMA. BOWEL SOUNDS ACTIVE. ANURIC. AV SHUNT TO LFA. AMBULATORY.
SKIN INTACT. DENIES PAIN. SL TO RAC AND MINDY, PATENT, CDI. BED IN LOWEST
POSITION, 2 SIDE RAILS UP, CALL LIGHT IN REACH. INSTRUCTED TO CALL FOR
ASSISTANCE.

## 2019-04-05 NOTE — NUR
PATIENT SLEEPING COMFORTABLY,NO RESP DISTRESS.NPO AFTER MN X MED,BRONCHOSCOPY
AM 0930.PATIENT AWARE.

## 2019-04-05 NOTE — NUR
AT 1200 - PATIENT BACK IN ROOM FOLLOWING BRONCHOSCOPY UNDER GENERAL
ANAESTHESIA. PATIENT IS AWAKE, ALERT AND ORIENTED X 4. VS WNL.
AT 1245 - EATING LUNCH.
AT 1350 - DIALYSIS NURSE AT BEDSIDE, PREPARING FOR HD.

## 2019-04-05 NOTE — NUR
HEMODIALYSIS COMPLETED. TOTAL OF 2.5 L REMOVED. POST HD /74. PATIENT NOW
EATING DINNER. CALLED PHARMACY TO OBTAIN PROCRIT AS ORDERED POST HD.

## 2019-04-05 NOTE — NUR
I AND O.REMAINS ANURIC,HD TODAY.FOR BRONCHOSCOPY AT 0930.PATIENT ALREADY
SIGNED CONSENT.NO CHECKLIST NEEDED PER CHARGE NURSE NAHOMY

## 2019-04-05 NOTE — NUR
AT 1142 - BLOOD GLUCOSE LEVEL 44. PATIENT IS AWAKE, ALERT AND ORIENTED. GIVEN
D50 AS PER EMAR. DAUGHTER WITH PATIENT.
AT 1150 - GIVEN FIRST DOSE OF LOVENOX AS PER EMAR. PATIENT PLACED ON O2 VIA NC
AT 2L/MIN BECAUSE OF ELEVATED TROPONIN LEVEL.
AT 1206 - BLOOD GLUCOSE LEVEL . PATIENT WILL EAT LUNCH.
AT 1330 - AMBULATED TO BATHROOM AND BACK TO BED. TOLERATED WELL. AHS EATEN
LUNCH. NO C/O CHEST PAIN.

## 2019-04-06 VITALS — DIASTOLIC BLOOD PRESSURE: 87 MMHG | SYSTOLIC BLOOD PRESSURE: 152 MMHG

## 2019-04-06 VITALS — DIASTOLIC BLOOD PRESSURE: 79 MMHG | SYSTOLIC BLOOD PRESSURE: 139 MMHG

## 2019-04-06 VITALS — SYSTOLIC BLOOD PRESSURE: 148 MMHG | DIASTOLIC BLOOD PRESSURE: 86 MMHG

## 2019-04-06 VITALS — DIASTOLIC BLOOD PRESSURE: 98 MMHG | SYSTOLIC BLOOD PRESSURE: 145 MMHG

## 2019-04-06 VITALS — DIASTOLIC BLOOD PRESSURE: 85 MMHG | SYSTOLIC BLOOD PRESSURE: 141 MMHG

## 2019-04-06 LAB
BASOPHILS NFR BLD: 0.2 % (ref 0–2)
BUN SERPL-MCNC: 39 MG/DL (ref 7–18)
CALCIUM SERPL-MCNC: 9.6 MG/DL (ref 8.5–10.1)
CHLORIDE SERPL-SCNC: 98 MMOL/L (ref 98–107)
CO2 SERPL-SCNC: 26.4 MMOL/L (ref 21–32)
CREAT SERPL-MCNC: 7.6 MG/DL (ref 0.7–1.3)
ERYTHROCYTE [DISTWIDTH] IN BLOOD BY AUTOMATED COUNT: 15.8 % (ref 11.5–14.5)
GFR SERPLBLD BASED ON 1.73 SQ M-ARVRAT: 8 ML/MIN
GLUCOSE SERPL-MCNC: 98 MG/DL (ref 74–106)
PLATELET # BLD: 313 X10^3MCL (ref 130–400)
POTASSIUM SERPL-SCNC: 3.9 MMOL/L (ref 3.5–5.1)
SODIUM SERPL-SCNC: 137 MMOL/L (ref 136–145)

## 2019-04-06 NOTE — NUR
AOX4. TELE #21, SR. LUNGS CLEAR ON RA. C/O NON PRODUCTIVE COUGH. PULSES
PALPABLE. NO EDEMA. BOWEL SOUNDS ACTIVE. DENIES N/V/D. ANURIC. AV SHUNT TO
LFA, DRESSING CDI. AMBULATORY. SKIN INTACT. DENIES PAIN. SL TO RAC AND MINDY,
PATENT, CDI. BED IN LOWEST POSITION, 2 SIDE RAILS UP, CALL LIGHT IN REACH.
INSTRUCTED TO CALL FOR ASSISTANCE.

## 2019-04-06 NOTE — NUR
RECEIVED AWAKE, ALERT AND ORIENTED. NO ACUTE RESP. DISTRESS NOTED. NO C/O PAIN
OR DISCOMFORT. BP SLIGHTLY ELEVETED BUT PT ASYMPTOMATIC. DENIES CHEST PAIN OR
LIGH HEADED. CALL LIGHT WITHIN REACH. WILL CONTINUE WITH PLAN OF CARE.

## 2019-04-06 NOTE — NUR
RESTING IN BED WITH EYES CLOSED. BREATHING EVEN AND UNLABORED. NO ACUTE
DISTRESS NOTED. WILL CONTINUE TO MONITOR.

## 2019-04-06 NOTE — NUR
NON PRODUCTIVE COUGH NOTED THIS AM. NO BLOOD OBSERVED. AFEBRILE OVERNIGHT. NO
ACUTE DISTRESS NOTED. NO ACUTE CHANGES. WILL ENDORSE TO ONCOMING RN.

## 2019-04-06 NOTE — NUR
REMAINS IN NO DISTRESS, AWAKE AND ALERT. SITTING ON THE CHAIR. NO C/O PAIN OR
DISCOMFORT AT THIS TIME. CALL LIGHT WITHIN REACH. WILL BE ENDORSED TO INCOMING
SHIFT.

## 2019-04-07 VITALS — DIASTOLIC BLOOD PRESSURE: 93 MMHG | SYSTOLIC BLOOD PRESSURE: 147 MMHG

## 2019-04-07 VITALS — SYSTOLIC BLOOD PRESSURE: 139 MMHG | DIASTOLIC BLOOD PRESSURE: 87 MMHG

## 2019-04-07 VITALS — SYSTOLIC BLOOD PRESSURE: 147 MMHG | DIASTOLIC BLOOD PRESSURE: 90 MMHG

## 2019-04-07 VITALS — DIASTOLIC BLOOD PRESSURE: 92 MMHG | SYSTOLIC BLOOD PRESSURE: 140 MMHG

## 2019-04-07 VITALS — DIASTOLIC BLOOD PRESSURE: 83 MMHG | SYSTOLIC BLOOD PRESSURE: 140 MMHG

## 2019-04-07 LAB
BASOPHILS NFR BLD: 0.6 % (ref 0–2)
BUN SERPL-MCNC: 59 MG/DL (ref 7–18)
CALCIUM SERPL-MCNC: 10.1 MG/DL (ref 8.5–10.1)
CHLORIDE SERPL-SCNC: 101 MMOL/L (ref 98–107)
CO2 SERPL-SCNC: 23.8 MMOL/L (ref 21–32)
CREAT SERPL-MCNC: 10.1 MG/DL (ref 0.7–1.3)
ERYTHROCYTE [DISTWIDTH] IN BLOOD BY AUTOMATED COUNT: 15.8 % (ref 11.5–14.5)
GFR SERPLBLD BASED ON 1.73 SQ M-ARVRAT: 6 ML/MIN
GLUCOSE SERPL-MCNC: 82 MG/DL (ref 74–106)
PLATELET # BLD: 380 X10^3MCL (ref 130–400)
POTASSIUM SERPL-SCNC: 3.7 MMOL/L (ref 3.5–5.1)
SODIUM SERPL-SCNC: 140 MMOL/L (ref 136–145)

## 2019-04-07 NOTE — NUR
Follow-up Nutrition Assessment-
Dx: fever, sepsis
Labs: (04/07) Na 140, K 3.7, Glu 82, BUN 59, Cr 10.1, H/H 7.6/22.
Meds: Colace, cozaar, lactinex, lotensin, norco, Procrit, renagel, Tylenol,
Zofran
Diet: Renal
PO Intakes: (04/06) D: 90%, L: 100%, B: 90%; (04/05) D: 100%, L: 100%, B:
100%; (04/04) D: 100%, L: 100%, B: NPO
Weights: (04/03) 142#/65 kg
Skin: intact
Edema: none
Last BM: 04/06/19 x 1
Per previous RDN Note (04/03), Pt admitted with new onset N/V/D. Pt does his
own dialysis every other day with left arm fistula. Pt reported having
diarrhea since admission, is improving. Pt also reported poor appetite.
 
Pt started on renal diet  on 04/05/19 as recommended.
 
Pt has good PO intake now that he is on a renal diet.
 
Per NP Progress Note (04/07), Pt has been off of oxygen since yesterday, doing
well. Awaiting bronchial washing results, continues on IVABX as per ID.
 
Estimated Nutritional Needs unchanged from prior assessment:
Energy: 2658-6096 kcal/d (30-35 kcal/kg - HD)
Protein:  gm/d (1.5-2.0 gm/kg - maintenance, preservation of lean body
mass, HD)
Fluid: 4066-6915 mL/d (1 mL/kcal - fluid balance) or per MD d/t HD
Nutrition Diagnosis
1. Inadequate oral intake related to medical condition as evidenced by
self-reported poor PO, Pt on full liquid diet since 03/31/19. (
Intervention/RDN Recommendation(s):
1. Continue on renal diet as tolerated.
Monitor/Evaluate
Previous Goal: Intake via PO intakes to meet at least 75% of estimated needs
with acceptable tolerance within 3-5 days. (Met)
Goal: Intake via PO intakes to meet at least 75% of estimated needs with
acceptable tolerance within 3-5 days.
Monitor: PO intakes and/or nutrition support tolerance, Labs, GI function,
Skin integrity, Weights.
 F/U in 3-5 days as moderate risk (04/10-12)

## 2019-04-07 NOTE — NUR
RECEIVED AWAKE, ALERT AND ORIENTED. IN NO ACUTE RESP. DISTRESS. NO C/O PAIN OR
DISCOMFORT AT THIS TIME. CALL LIGHT WITHIN REACH. WILL CONTINUE WITH PLAN OF
CARE.

## 2019-04-07 NOTE — NUR
AOX4. TELE #21, SR. LUNGS CLEAR ON RA. C/O COUGH. PULSES PALPABLE. NO EDEMA.
BOWEL SOUNDS ACTIVE. DENIES N/V/D. ANURIC. AV SHUNT TO LFA, DRESSING CDI.
AMBULATORY. SKIN INTACT. DENIES GENERALIZED BODY ACHE, WILL MEDICATE PER EMAR.
SL TO RAC AND MINDY, PATENT, CDI. BED IN LOWEST POSITION, 2 SIDE RAILS UP, CALL
LIGHT IN REACH. INSTRUCTED TO CALL FOR ASSISTANCE.

## 2019-04-07 NOTE — NUR
REMAINS IN NO DISTRESS, AWAKE ALERT AND ORIENTED. FAMILY AT BEDSIDE. NO C/O
PAIN OR DISCOMFORT AT THIS TIME. NO CHANGES IN VS. CALL LIGHT WITHIN REACH.
WILL BE ENDORSED TO INCOMING SHIFT.

## 2019-04-08 VITALS — SYSTOLIC BLOOD PRESSURE: 144 MMHG | DIASTOLIC BLOOD PRESSURE: 80 MMHG

## 2019-04-08 VITALS — DIASTOLIC BLOOD PRESSURE: 97 MMHG | SYSTOLIC BLOOD PRESSURE: 148 MMHG

## 2019-04-08 VITALS — DIASTOLIC BLOOD PRESSURE: 91 MMHG | SYSTOLIC BLOOD PRESSURE: 135 MMHG

## 2019-04-08 PROCEDURE — 5A1D70Z PERFORMANCE OF URINARY FILTRATION, INTERMITTENT, LESS THAN 6 HOURS PER DAY: ICD-10-PCS | Performed by: FAMILY MEDICINE

## 2019-04-08 NOTE — NUR
HEMODIALYSIS COMPLETED, 2.5L DIALYSIS OUTPUT. PATIENT IS STABLE, NO ACUTE
CHANGES THROUGHOUT SHIFT. PHARMACY NOTIFIED THAT DIALYSIS IS COMPLETED,
PHARMACY WILL ORDER GENTAMICIN RANDOM IN THE MORNING. RYAN PADILLA AWARE PATIENT
WILL RECEIVE GENTAMICIN RANDOM TOMORROW. IV TO RAC, AND MINDY JORGENSEN, NO REDNESS
SWELLING OR PAIN NOTED. CALL LIGHT WITHIN REACH, BED IN LOW POSITION.

## 2019-04-08 NOTE — NUR
RECEIVED PT SITTING AT THE EDGE OF THE BED.AAOX4,AMBULATORY. LUNG SOUND
CTA.BREATHING EVEN AND UNLABORED.IV SITE PATENT AND INTACT. AV SHUNT TO
LFA.POSITIVE FOR BRUIT AND THRILL.DENIES ANY PAIN AT THSI TIME. BED IN LOWEST
POSITION,CALL LIGHT WITHIN REACH. WILL CONTINUE TO MONITOR.

## 2019-04-08 NOTE — NUR
PATIENT RESTING IN BED, PATIENT DENIES PAIN. SOB NOTED, RT NOTIFIED FOR
BREATHING TREATMENT. COUGH NOTED ON INHALATION. AV SHUNT NOTED TO LFA (+B&T).
[facility] IS ANURIC, RECEIVES DIALYSIS MWF. PATIENT IS AMBULATORY. DRYNESS NOTED
AROUND AV SHUNT. IV TO RAC, AND MINDY SALINE LOCK, IV SITE PATENT, NO REDNESS,
SWELLING OR PAIN NOTED. CALL LIGHT WITHIN REACH, BED IN LOW POSITION, WILL
CONTINUE TO MONITOR PATIENT.

## 2019-04-08 NOTE — NUR
HEMODIALYSIS NURSE AT BEDSIDE, PATIENT DENIES ANY PAIN AT THIS TIME. FAMILY AT
BEDSIDE. CALL LIGHT WITHIN REACH, BED IN LOW POSITION, WILL CONTINUE TO
MONITOR PATIENT.

## 2019-04-09 VITALS — SYSTOLIC BLOOD PRESSURE: 123 MMHG | DIASTOLIC BLOOD PRESSURE: 69 MMHG

## 2019-04-09 VITALS — DIASTOLIC BLOOD PRESSURE: 106 MMHG | SYSTOLIC BLOOD PRESSURE: 154 MMHG

## 2019-04-09 VITALS — SYSTOLIC BLOOD PRESSURE: 149 MMHG | DIASTOLIC BLOOD PRESSURE: 88 MMHG

## 2019-04-09 NOTE — NUR
RECIEVED PT ON BED, AWAKE, ALERT, AND ORIENTED. HAS NO COMPLAINT OF PAIN, SOB,
OR DIZZINESS. RESPONDS WELL TO QUESTION AND ANSWER. CLEAR HENRY LUNG FIELD,
SYMMETRICAL CHEST EXPANSION AND UNLABORED. AV SHUNT NOTED ON THE LFA. LAST HD
4/8 2.5L OUT. SIDE RAILS UP, CALL LIGHT WITHIN REACH, WILL CONTINUE TO MONITOR

## 2019-04-09 NOTE — NUR
PT REMAINED ASLEEP. NO DISTRESS NOTED.DENIES ANY PAIN AT THSI TIME. BED IN
LOWEST POSITION,CALL LIGHT WITHIN REACH. WILL CONTINUE TO MONITOR.

## 2019-04-09 NOTE — NUR
PT ON BED, AWAKE, ALERT, AND ORIENTED. HAS NO COMPLAINT OF PAIN, SOB, OR
DIZZINESS. RESPONDS WELL TO QUESTION AND ANSWER. WILL CONTINUE TO MONITOR

## 2019-04-09 NOTE — NUR
PT RECIEVED AAO REG RESP NO SOB R/A SAT 96%,PT HAVING RT TREAMENT WITH V/S
STABLE,PT HAS AV SHUNT TO THE LT ARM WITH THE SITE THRILL AND BRUIT,ALSO
DISCOLORATION TO THE SITE,HL TO THE RT ARM WITH THE SITE INTACT,ABDO IS SOFT
WITH ACTIVE BOWEL SOUNDS,KEPT CLEAN AND DRY TO TOUCH,CALL LIGHT EASY REACHED
AND WILL CONTINUE TO MONITOR.

## 2019-04-10 VITALS — SYSTOLIC BLOOD PRESSURE: 129 MMHG | DIASTOLIC BLOOD PRESSURE: 73 MMHG

## 2019-04-10 VITALS — DIASTOLIC BLOOD PRESSURE: 77 MMHG | SYSTOLIC BLOOD PRESSURE: 116 MMHG

## 2019-04-10 VITALS — DIASTOLIC BLOOD PRESSURE: 66 MMHG | SYSTOLIC BLOOD PRESSURE: 126 MMHG

## 2019-04-10 VITALS — SYSTOLIC BLOOD PRESSURE: 134 MMHG | DIASTOLIC BLOOD PRESSURE: 91 MMHG

## 2019-04-10 LAB
BASOPHILS NFR BLD: 0.9 % (ref 0–2)
BUN SERPL-MCNC: 59 MG/DL (ref 7–18)
C-ANCA TITR SER IF: (no result) TITER
CALCIUM SERPL-MCNC: 9.9 MG/DL (ref 8.5–10.1)
CHLORIDE SERPL-SCNC: 99 MMOL/L (ref 98–107)
CO2 SERPL-SCNC: 22.5 MMOL/L (ref 21–32)
CREAT SERPL-MCNC: 11.1 MG/DL (ref 0.7–1.3)
ERYTHROCYTE [DISTWIDTH] IN BLOOD BY AUTOMATED COUNT: 16.9 % (ref 11.5–14.5)
GFR SERPLBLD BASED ON 1.73 SQ M-ARVRAT: 5 ML/MIN
GLUCOSE SERPL-MCNC: 93 MG/DL (ref 74–106)
P-ANCA TITR SER IF: (no result) TITER
PLATELET # BLD: 409 X10^3MCL (ref 130–400)
POTASSIUM SERPL-SCNC: 4.5 MMOL/L (ref 3.5–5.1)
SODIUM SERPL-SCNC: 135 MMOL/L (ref 136–145)

## 2019-04-10 PROCEDURE — 5A1D70Z PERFORMANCE OF URINARY FILTRATION, INTERMITTENT, LESS THAN 6 HOURS PER DAY: ICD-10-PCS | Performed by: FAMILY MEDICINE

## 2019-04-10 NOTE — NUR
PT COMPLETED HD. WAITING FOR PHRM TO BRING LAST DOSE OF ANTIBIOTICS, WILL GIVE
AND PT WILL THEN GO HOME. WILL CONTINUE TO MONITOR.

## 2019-04-10 NOTE — NUR
PT ENDORSE TO ME THIS MORNING.LAYING IN BED RESTING. AA/O X4, BREATHING EVEN
AND UNLABORED ON RA. NO ACUTE RESP DISTRESS OR SOB NOTED. HD PT ON MWF AND
SAT. LAST HD 4/8 2.5L OUT. SCHD FOR HD TODAY.[facility]. DENIES ANY CP OR
PRESSURE. LAST BM 4/8 REG. AV SHUNT NOTED LFA BRUIT AND THRILL NOTED. R ARM
SWELLING NOTED BROOKE/ PENDING US TO SITE. IV TO THE THE RFA / HEPLOCKED. CALL
LIGHT IN REACH. BED IN LOW POSITION. WILL CONTINUE PLAN OF CARE.

## 2019-04-10 NOTE — NUR
NOTICE SWOLLEN RT LOWER ARM WARM AND DRY TO TOUCH DEMARCATION OF THE SWOLLEN
AREA MADE CHARGE NURSE AND DR BRADSHAW AWARE,WHEN TO SEE THE PATIENT,SAYS WILL
ORDER U/S OF THE ARM,STARTED A NEW HL TO THE RT LOWE ARM # 20 SITE IS PATENT
AND INTACT,PT HAD A RESTING NIGHT NO CHANGE AT THIS TIME,WILL CONTINUE TO
MONITOR.

## 2019-04-10 NOTE — NUR
EXPLAINED DISCHARGE INSTRUCTIONS, CONTINUED MEDS AND FOLLOW UP APPOINTMENT PT
NEEDS TO CALL PRIMARY DOC FOR APPT, AGREED AND SIGNED ALL DOCUMENTS. PT IS D/C
HOME WITH IV TO THE [facility] FOR HOME IV THERAPY THROUGH [facility], GIVE
#. PT WILL CALL NURSING STATION WHEN HIS DAUGHTER IS HERE. WILL ENDORSE.

## 2019-06-15 ENCOUNTER — HOSPITAL ENCOUNTER (INPATIENT)
Dept: HOSPITAL 1 - ED | Age: 49
LOS: 1 days | Discharge: HOME | DRG: 425 | End: 2019-06-16
Attending: HOSPITALIST | Admitting: HOSPITALIST
Payer: COMMERCIAL

## 2019-06-15 VITALS
HEIGHT: 66 IN | BODY MASS INDEX: 30.07 KG/M2 | WEIGHT: 187.12 LBS | BODY MASS INDEX: 30.07 KG/M2 | WEIGHT: 187.12 LBS | HEIGHT: 66 IN

## 2019-06-15 VITALS — DIASTOLIC BLOOD PRESSURE: 96 MMHG | SYSTOLIC BLOOD PRESSURE: 145 MMHG

## 2019-06-15 DIAGNOSIS — Z83.3: ICD-10-CM

## 2019-06-15 DIAGNOSIS — I12.0: ICD-10-CM

## 2019-06-15 DIAGNOSIS — E83.51: Primary | ICD-10-CM

## 2019-06-15 DIAGNOSIS — Z99.2: ICD-10-CM

## 2019-06-15 DIAGNOSIS — Z82.3: ICD-10-CM

## 2019-06-15 DIAGNOSIS — N17.0: ICD-10-CM

## 2019-06-15 DIAGNOSIS — D64.9: ICD-10-CM

## 2019-06-15 DIAGNOSIS — E87.5: ICD-10-CM

## 2019-06-15 DIAGNOSIS — Z88.1: ICD-10-CM

## 2019-06-15 DIAGNOSIS — Z80.3: ICD-10-CM

## 2019-06-15 DIAGNOSIS — N18.6: ICD-10-CM

## 2019-06-15 LAB
ALBUMIN SERPL-MCNC: 3.5 G/DL (ref 3.4–5)
ALP SERPL-CCNC: 323 U/L (ref 46–116)
ALT SERPL-CCNC: 20 U/L (ref 16–63)
AST SERPL-CCNC: 16 U/L (ref 15–37)
BASOPHILS NFR BLD: 0.4 % (ref 0–2)
BILIRUB SERPL-MCNC: 0.3 MG/DL (ref 0.2–1)
BUN SERPL-MCNC: 57 MG/DL (ref 7–18)
CALCIUM SERPL-MCNC: 5.3 MG/DL (ref 8.5–10.1)
CHLORIDE SERPL-SCNC: 102 MMOL/L (ref 98–107)
CO2 SERPL-SCNC: 23.2 MMOL/L (ref 21–32)
CREAT SERPL-MCNC: 13.1 MG/DL (ref 0.7–1.3)
ERYTHROCYTE [DISTWIDTH] IN BLOOD BY AUTOMATED COUNT: 15.1 % (ref 11.5–14.5)
GFR SERPLBLD BASED ON 1.73 SQ M-ARVRAT: 4 ML/MIN
GLUCOSE SERPL-MCNC: 105 MG/DL (ref 74–106)
MAGNESIUM SERPL-MCNC: 2.1 MG/DL (ref 1.8–2.4)
PHOSPHATE SERPL-MCNC: 3.8 MG/DL (ref 2.5–4.9)
PLATELET # BLD: 248 X10^3MCL (ref 130–400)
POTASSIUM SERPL-SCNC: 6.3 MMOL/L (ref 3.5–5.1)
PROT SERPL-MCNC: 7.6 G/DL (ref 6.4–8.2)
SODIUM SERPL-SCNC: 140 MMOL/L (ref 136–145)
T3 SERPL-MCNC: 1.03 NG/ML
T3RU NFR SERPL: 33 % UPTAKE (ref 33–39)
T4 FREE SERPL-MCNC: 1.16 NG/DL (ref 0.76–1.46)
T4 SERPL-MCNC: 9.1 UG/DL (ref 4.7–13.3)
T4/T3 UPTAKE INDEX SERPL: 3 UG/DL (ref 1.4–4.5)

## 2019-06-15 PROCEDURE — G0378 HOSPITAL OBSERVATION PER HR: HCPCS

## 2019-06-15 NOTE — NUR
PATIENT SEEN IN NO APPARENT DISTRESS. COMPLAIN OF BODY ACHE AND DIZZINESS.
PATIENT HAD THYROID SURGERY ON 6/5. DOES HEMODIALYSIS AT HOME, LAST DONE ON
FRIDAY. PATIENT IS WAIITNG TO BE SEEN BY MD.

## 2019-06-16 VITALS — SYSTOLIC BLOOD PRESSURE: 153 MMHG | DIASTOLIC BLOOD PRESSURE: 91 MMHG

## 2019-06-16 VITALS — DIASTOLIC BLOOD PRESSURE: 104 MMHG | SYSTOLIC BLOOD PRESSURE: 172 MMHG

## 2019-06-16 VITALS — SYSTOLIC BLOOD PRESSURE: 147 MMHG | DIASTOLIC BLOOD PRESSURE: 94 MMHG

## 2019-06-16 VITALS — SYSTOLIC BLOOD PRESSURE: 160 MMHG | DIASTOLIC BLOOD PRESSURE: 100 MMHG

## 2019-06-16 VITALS — DIASTOLIC BLOOD PRESSURE: 97 MMHG | SYSTOLIC BLOOD PRESSURE: 170 MMHG

## 2019-06-16 VITALS — DIASTOLIC BLOOD PRESSURE: 94 MMHG | SYSTOLIC BLOOD PRESSURE: 147 MMHG

## 2019-06-16 LAB
BASOPHILS NFR BLD: 0.7 % (ref 0–2)
BUN SERPL-MCNC: 61 MG/DL (ref 7–18)
CALCIUM SERPL-MCNC: 6.1 MG/DL (ref 8.5–10.1)
CHLORIDE SERPL-SCNC: 108 MMOL/L (ref 98–107)
CHOLEST SERPL-MCNC: 124 MG/DL (ref ?–200)
CHOLEST/HDLC SERPL: 3 MG/DL
CO2 SERPL-SCNC: 21.2 MMOL/L (ref 21–32)
CREAT SERPL-MCNC: 14.3 MG/DL (ref 0.7–1.3)
ERYTHROCYTE [DISTWIDTH] IN BLOOD BY AUTOMATED COUNT: 15.1 % (ref 11.5–14.5)
GFR SERPLBLD BASED ON 1.73 SQ M-ARVRAT: 4 ML/MIN
GLUCOSE SERPL-MCNC: 88 MG/DL (ref 74–106)
HDLC SERPL-MCNC: 41 MG/DL (ref 40–60)
MICROSCOPIC UR-IMP: YES
PLATELET # BLD: 228 X10^3MCL (ref 130–400)
POTASSIUM SERPL-SCNC: 5.4 MMOL/L (ref 3.5–5.1)
RBC # UR STRIP.AUTO: (no result) /UL
SODIUM SERPL-SCNC: 147 MMOL/L (ref 136–145)
TRIGL SERPL-MCNC: 106 MG/DL (ref ?–150)
UA SPECIFIC GRAVITY: 1.01 (ref 1–1.03)

## 2019-06-16 NOTE — NUR
DOCTOR CRUDO AND MEDICAL TEAM AT BEDSIDE FOR AM ROUND. CURRENT CONDITION
UPDATED. PATIENT MADE AWARE PLAN OF CARE. WILL BE DISCHARGED HOME TODAY.

## 2019-06-16 NOTE — NUR
RECEIVED PT FROM ED VIA LISA. ORIENTED PT TO ROOM AND SURROUNDINGS. IV
NOTED TO RAC PATENT AND INTACT. TELE 28 PLACED ON PT READING NSR. INSTRUCTED
PT ON THE USE OF CALL LIGHT FOR ASSISTANCE. ENDORSED PT TO PRIMARY BRENDON MARS

## 2019-06-16 NOTE — NUR
PT HAD RESTFUL NIGHT. NO S/S ACUTE DISTRESS. MEDICATED PER EMAR FOR /97,
HR 66. WILL RECHECK BP WHEN APPROPRIATE. BREATHING E/U. NO  CHANGES
OVERNIGHT. ALL NEEDS MET AND ATTENDED TO. CALL LIGHT WITHIN REACH. PT AWARE OF
NEED FOR STOOL SAMPLE. WILL ENDORSE CARE TO ONCOMING SHIFT.

## 2019-06-16 NOTE — NUR
RESTING WITH EYES CLOSED, EASILY TO AROUSE, AAOX4, WENT BACK TO SLEEP. NO RESP
DISTRESS NOTED. RENAL DIET AT BEDSIDE. ANURIC, ON HD EVERY OTHER DAY, AV SHUNT
TO LFA WITH (+) BRUITH/THRIL. S/L TO RAC INTACT. CALL LIGHT PLACED WITHIN EASY
REACH. SIDERAILS UP X2.

## 2019-06-16 NOTE — NUR
DISCHARGE INSTRUCTION AND PRESCRIPTION EXPLAINED AND GIVEN TO PATIENT WHO IS
AWAKE, ALERT, ORIENTED X4. S/L TO RAC REMOVED WITH CATHETER INTACT, DRSG
APPLIED. TELEMETRY REMOVED, CLEANED AND RETURNED TO [place]. [facility] WHEELCHAIR,
ACCOMPANIED BY CNA AND PATIENT'S DAUGHTER TO LOBBY WITH STABLE CONDITION.

## 2019-11-22 NOTE — NUR
HEMODIALYSIS COMPLETED. 2000ML OUTPUT. DUE MEDS GIVEN. HYDRALAZINE HELD DUE TO
PT REFUSED DUE TO LOW /57. PT DENIES PAIN OR DISCOMFORT. CALL LIGHT
WITHIN REACH. No

## 2020-05-16 NOTE — NUR
RECEIVED PT VIA [employer] FROM E/D, ACCOMPANIED BY RN, TRANSPORTER, AND DAUGHTER
(JORJE ARMENDARIZ). PT A/A/O X 4, CALM, COOPERATIVE. PT AMBULATES WITHOUT GAIT OR
BALANCE IMPAIRMENT. ON TELE # 21, NSR, HR 89, DENIES CHEST PAIN OR DISCOMFORT
AT THIS TIME. BUL/BLL DIM, CHEST RISING EVENLY, 2LNC, 97%, NO ACUTE
RESPIRATORY DISTRESS NOTED. ABD SOFT, ROUND, NON-TENDER, NORMOACTIVE BOWEL
SOUDNS X 4 QUADS, LAST BM 03/30/19, DIARRHEA, REPORTS HAVING 30+ EPISODES. PT
ANURIC, HAS LFA FISTULA COVERED W/ DRY DRESSING, CDI, +THRILL/BRUIT, DOES HOME
DIALYSIS 4-5X/WEEK, LAST SESSION 03/28/19, TOOK OUT 2 LITERS. IV SITE RAC 18G,
CDI. ORIENTED PT TO ROOM, BED CONTROLS, CALL LIGHT SYSTEM. SIDE RAILS UP X 2,
BED IN LOW POSITION. WILL ENDORSE TO RYAN BHAKTA. 02/13/2012    CREATININE 0.88 05/16/2020    CALCIUM 8.5 05/16/2020    GFRAA >60 05/16/2020    LABGLOM >60 05/16/2020    GLUCOSE 105 05/16/2020    GLUCOSE 98 02/13/2012         ASSESSMENT   3 81-year-old male postop day 1 status post endovascular repair of abdominal aortic aneurysm measuring 8 cm    PLAN  1. Patient seen and examined at bedside this morning. 2. Pain control  3. Postop day 1 status post endovascular repair of infrarenal abdominal aortic aneurysm  4. Currently on Cleviprex drip, can titrate off and start oral meds per primary. 5. Recommend aggressive bowel regiment, concerned that constipation is attributing to his abdominal/back pain. 6. Medical management per primary.         Electronically signed by Kimberly Licea DO  on 5/16/2020 at 7:32 AM

## 2020-08-15 ENCOUNTER — HOSPITAL ENCOUNTER (INPATIENT)
Dept: HOSPITAL 1 - ED | Age: 50
LOS: 6 days | Discharge: HOME | DRG: 720 | End: 2020-08-21
Attending: FAMILY MEDICINE | Admitting: FAMILY MEDICINE
Payer: COMMERCIAL

## 2020-08-15 VITALS
WEIGHT: 190 LBS | BODY MASS INDEX: 30.53 KG/M2 | BODY MASS INDEX: 30.53 KG/M2 | HEIGHT: 66 IN | HEIGHT: 66 IN | WEIGHT: 190 LBS

## 2020-08-15 VITALS — DIASTOLIC BLOOD PRESSURE: 90 MMHG | SYSTOLIC BLOOD PRESSURE: 150 MMHG

## 2020-08-15 VITALS — SYSTOLIC BLOOD PRESSURE: 118 MMHG | DIASTOLIC BLOOD PRESSURE: 74 MMHG

## 2020-08-15 DIAGNOSIS — Z79.899: ICD-10-CM

## 2020-08-15 DIAGNOSIS — Z82.3: ICD-10-CM

## 2020-08-15 DIAGNOSIS — Z20.828: ICD-10-CM

## 2020-08-15 DIAGNOSIS — D63.1: ICD-10-CM

## 2020-08-15 DIAGNOSIS — J18.9: ICD-10-CM

## 2020-08-15 DIAGNOSIS — R74.0: ICD-10-CM

## 2020-08-15 DIAGNOSIS — M62.82: ICD-10-CM

## 2020-08-15 DIAGNOSIS — E87.8: ICD-10-CM

## 2020-08-15 DIAGNOSIS — E87.1: ICD-10-CM

## 2020-08-15 DIAGNOSIS — Z80.3: ICD-10-CM

## 2020-08-15 DIAGNOSIS — N18.6: ICD-10-CM

## 2020-08-15 DIAGNOSIS — E87.5: ICD-10-CM

## 2020-08-15 DIAGNOSIS — Z83.3: ICD-10-CM

## 2020-08-15 DIAGNOSIS — A41.01: Primary | ICD-10-CM

## 2020-08-15 DIAGNOSIS — I12.0: ICD-10-CM

## 2020-08-15 DIAGNOSIS — Z88.1: ICD-10-CM

## 2020-08-15 DIAGNOSIS — Z99.2: ICD-10-CM

## 2020-08-15 DIAGNOSIS — N17.0: ICD-10-CM

## 2020-08-15 LAB
ALBUMIN SERPL-MCNC: 3.6 G/DL (ref 3.4–5)
ALP SERPL-CCNC: 51 U/L (ref 46–116)
ALT SERPL-CCNC: 19 U/L (ref 16–63)
AMYLASE SERPL-CCNC: 167 U/L (ref 25–115)
AST SERPL-CCNC: 19 U/L (ref 15–37)
BASOPHILS NFR BLD: 0.1 % (ref 0–2)
BILIRUB SERPL-MCNC: 0.6 MG/DL (ref 0.2–1)
BUN SERPL-MCNC: 41 MG/DL (ref 7–18)
CALCIUM SERPL-MCNC: 7.2 MG/DL (ref 8.5–10.1)
CHLORIDE SERPL-SCNC: 86 MMOL/L (ref 98–107)
CHOLEST SERPL-MCNC: 179 MG/DL (ref ?–200)
CHOLEST/HDLC SERPL: 4 MG/DL
CO2 SERPL-SCNC: 29.7 MMOL/L (ref 21–32)
CREAT SERPL-MCNC: 10.5 MG/DL (ref 0.7–1.3)
ERYTHROCYTE [DISTWIDTH] IN BLOOD BY AUTOMATED COUNT: 14.7 % (ref 11.5–14.5)
GFR SERPLBLD BASED ON 1.73 SQ M-ARVRAT: 6 ML/MIN
GLUCOSE SERPL-MCNC: 107 MG/DL (ref 74–106)
HDLC SERPL-MCNC: 45 MG/DL (ref 40–60)
LIPASE SERPL-CCNC: 416 IU/L (ref 73–393)
MAGNESIUM SERPL-MCNC: 2 MG/DL (ref 1.8–2.4)
PHOSPHATE SERPL-MCNC: 4.9 MG/DL (ref 2.5–4.9)
PLATELET # BLD: 139 X10^3MCL (ref 130–400)
POTASSIUM SERPL-SCNC: 5 MMOL/L (ref 3.5–5.1)
PROT SERPL-MCNC: 8.6 G/DL (ref 6.4–8.2)
SODIUM SERPL-SCNC: 126 MMOL/L (ref 136–145)
TRIGL SERPL-MCNC: 152 MG/DL (ref ?–150)

## 2020-08-15 PROCEDURE — G0378 HOSPITAL OBSERVATION PER HR: HCPCS

## 2020-08-16 VITALS — DIASTOLIC BLOOD PRESSURE: 75 MMHG | SYSTOLIC BLOOD PRESSURE: 128 MMHG

## 2020-08-16 VITALS — SYSTOLIC BLOOD PRESSURE: 105 MMHG | DIASTOLIC BLOOD PRESSURE: 69 MMHG

## 2020-08-16 VITALS — DIASTOLIC BLOOD PRESSURE: 69 MMHG | SYSTOLIC BLOOD PRESSURE: 116 MMHG

## 2020-08-16 VITALS — SYSTOLIC BLOOD PRESSURE: 121 MMHG | DIASTOLIC BLOOD PRESSURE: 63 MMHG

## 2020-08-16 VITALS — DIASTOLIC BLOOD PRESSURE: 69 MMHG | SYSTOLIC BLOOD PRESSURE: 114 MMHG

## 2020-08-16 LAB
BUN SERPL-MCNC: 60 MG/DL (ref 7–18)
CALCIUM SERPL-MCNC: 6.4 MG/DL (ref 8.5–10.1)
CHLORIDE SERPL-SCNC: 88 MMOL/L (ref 98–107)
CO2 SERPL-SCNC: 26.9 MMOL/L (ref 21–32)
CREAT SERPL-MCNC: 12.4 MG/DL (ref 0.7–1.3)
GFR SERPLBLD BASED ON 1.73 SQ M-ARVRAT: 5 ML/MIN
GLUCOSE SERPL-MCNC: 96 MG/DL (ref 74–106)
PHOSPHATE SERPL-MCNC: 6.8 MG/DL (ref 2.5–4.9)
POTASSIUM SERPL-SCNC: 4.1 MMOL/L (ref 3.5–5.1)
SODIUM SERPL-SCNC: 129 MMOL/L (ref 136–145)

## 2020-08-16 PROCEDURE — 5A1D70Z PERFORMANCE OF URINARY FILTRATION, INTERMITTENT, LESS THAN 6 HOURS PER DAY: ICD-10-PCS | Performed by: INTERNAL MEDICINE

## 2020-08-17 VITALS — DIASTOLIC BLOOD PRESSURE: 53 MMHG | SYSTOLIC BLOOD PRESSURE: 93 MMHG

## 2020-08-17 VITALS — DIASTOLIC BLOOD PRESSURE: 63 MMHG | SYSTOLIC BLOOD PRESSURE: 101 MMHG

## 2020-08-17 VITALS — SYSTOLIC BLOOD PRESSURE: 91 MMHG | DIASTOLIC BLOOD PRESSURE: 52 MMHG

## 2020-08-17 VITALS — SYSTOLIC BLOOD PRESSURE: 107 MMHG | DIASTOLIC BLOOD PRESSURE: 67 MMHG

## 2020-08-17 LAB
BASOPHILS NFR BLD: 0.4 % (ref 0–2)
BUN SERPL-MCNC: 44 MG/DL (ref 7–18)
CALCIUM SERPL-MCNC: 7.5 MG/DL (ref 8.5–10.1)
CHLORIDE SERPL-SCNC: 92 MMOL/L (ref 98–107)
CO2 SERPL-SCNC: 33.5 MMOL/L (ref 21–32)
CREAT SERPL-MCNC: 10.5 MG/DL (ref 0.7–1.3)
ERYTHROCYTE [DISTWIDTH] IN BLOOD BY AUTOMATED COUNT: 14.4 % (ref 11.5–14.5)
GFR SERPLBLD BASED ON 1.73 SQ M-ARVRAT: 6 ML/MIN
GLUCOSE SERPL-MCNC: 102 MG/DL (ref 74–106)
MAGNESIUM SERPL-MCNC: 2.2 MG/DL (ref 1.8–2.4)
PHOSPHATE SERPL-MCNC: 6.7 MG/DL (ref 2.5–4.9)
PLATELET # BLD: 141 X10^3MCL (ref 130–400)
POTASSIUM SERPL-SCNC: 3.9 MMOL/L (ref 3.5–5.1)
SODIUM SERPL-SCNC: 133 MMOL/L (ref 136–145)

## 2020-08-18 VITALS — DIASTOLIC BLOOD PRESSURE: 66 MMHG | SYSTOLIC BLOOD PRESSURE: 102 MMHG

## 2020-08-18 VITALS — SYSTOLIC BLOOD PRESSURE: 133 MMHG | DIASTOLIC BLOOD PRESSURE: 80 MMHG

## 2020-08-18 VITALS — DIASTOLIC BLOOD PRESSURE: 67 MMHG | SYSTOLIC BLOOD PRESSURE: 117 MMHG

## 2020-08-18 VITALS — DIASTOLIC BLOOD PRESSURE: 75 MMHG | SYSTOLIC BLOOD PRESSURE: 111 MMHG

## 2020-08-18 VITALS — DIASTOLIC BLOOD PRESSURE: 67 MMHG | SYSTOLIC BLOOD PRESSURE: 113 MMHG

## 2020-08-18 LAB
ALBUMIN SERPL-MCNC: 2.6 G/DL (ref 3.4–5)
ALP SERPL-CCNC: 44 U/L (ref 46–116)
ALT SERPL-CCNC: 14 U/L (ref 16–63)
AST SERPL-CCNC: 12 U/L (ref 15–37)
BASOPHILS NFR BLD: 0.5 % (ref 0–2)
BILIRUB SERPL-MCNC: 0.57 MG/DL (ref 0.2–1)
BUN SERPL-MCNC: 66 MG/DL (ref 7–18)
CALCIUM SERPL-MCNC: 7.2 MG/DL (ref 8.5–10.1)
CHLORIDE SERPL-SCNC: 91 MMOL/L (ref 98–107)
CO2 SERPL-SCNC: 25.6 MMOL/L (ref 21–32)
CREAT SERPL-MCNC: 13.5 MG/DL (ref 0.7–1.3)
CRP SERPL-MCNC: 31.9 MG/DL (ref ?–0.9)
ERYTHROCYTE [DISTWIDTH] IN BLOOD BY AUTOMATED COUNT: 14.6 % (ref 11.5–14.5)
GFR SERPLBLD BASED ON 1.73 SQ M-ARVRAT: 4 ML/MIN
GLUCOSE SERPL-MCNC: 87 MG/DL (ref 74–106)
MAGNESIUM SERPL-MCNC: 2.2 MG/DL (ref 1.8–2.4)
PHOSPHATE SERPL-MCNC: 7.1 MG/DL (ref 2.5–4.9)
PLATELET # BLD: 155 X10^3MCL (ref 130–400)
POTASSIUM SERPL-SCNC: 3.7 MMOL/L (ref 3.5–5.1)
PROT SERPL-MCNC: 7.4 G/DL (ref 6.4–8.2)
SODIUM SERPL-SCNC: 134 MMOL/L (ref 136–145)

## 2020-08-18 PROCEDURE — 5A1D70Z PERFORMANCE OF URINARY FILTRATION, INTERMITTENT, LESS THAN 6 HOURS PER DAY: ICD-10-PCS

## 2020-08-19 VITALS — DIASTOLIC BLOOD PRESSURE: 69 MMHG | SYSTOLIC BLOOD PRESSURE: 109 MMHG

## 2020-08-19 VITALS — SYSTOLIC BLOOD PRESSURE: 145 MMHG | DIASTOLIC BLOOD PRESSURE: 96 MMHG

## 2020-08-19 VITALS — DIASTOLIC BLOOD PRESSURE: 77 MMHG | SYSTOLIC BLOOD PRESSURE: 130 MMHG

## 2020-08-19 VITALS — SYSTOLIC BLOOD PRESSURE: 122 MMHG | DIASTOLIC BLOOD PRESSURE: 72 MMHG

## 2020-08-19 VITALS — DIASTOLIC BLOOD PRESSURE: 90 MMHG | SYSTOLIC BLOOD PRESSURE: 115 MMHG

## 2020-08-19 LAB
BASOPHILS NFR BLD: 0.5 % (ref 0–2)
BUN SERPL-MCNC: 46 MG/DL (ref 7–18)
CALCIUM SERPL-MCNC: 8.3 MG/DL (ref 8.5–10.1)
CHLORIDE SERPL-SCNC: 96 MMOL/L (ref 98–107)
CO2 SERPL-SCNC: 26.8 MMOL/L (ref 21–32)
CREAT SERPL-MCNC: 10.4 MG/DL (ref 0.7–1.3)
ERYTHROCYTE [DISTWIDTH] IN BLOOD BY AUTOMATED COUNT: 15.1 % (ref 11.5–14.5)
GFR SERPLBLD BASED ON 1.73 SQ M-ARVRAT: 6 ML/MIN
GLUCOSE SERPL-MCNC: 78 MG/DL (ref 74–106)
PLATELET # BLD: 195 X10^3MCL (ref 130–400)
POTASSIUM SERPL-SCNC: 4 MMOL/L (ref 3.5–5.1)
SODIUM SERPL-SCNC: 138 MMOL/L (ref 136–145)

## 2020-08-20 VITALS — DIASTOLIC BLOOD PRESSURE: 84 MMHG | SYSTOLIC BLOOD PRESSURE: 139 MMHG

## 2020-08-20 VITALS — SYSTOLIC BLOOD PRESSURE: 115 MMHG | DIASTOLIC BLOOD PRESSURE: 70 MMHG

## 2020-08-20 VITALS — SYSTOLIC BLOOD PRESSURE: 113 MMHG | DIASTOLIC BLOOD PRESSURE: 80 MMHG

## 2020-08-20 VITALS — DIASTOLIC BLOOD PRESSURE: 74 MMHG | SYSTOLIC BLOOD PRESSURE: 114 MMHG

## 2020-08-20 VITALS — SYSTOLIC BLOOD PRESSURE: 127 MMHG | DIASTOLIC BLOOD PRESSURE: 89 MMHG

## 2020-08-20 LAB
BASOPHILS NFR BLD: 0.6 % (ref 0–2)
BUN SERPL-MCNC: 68 MG/DL (ref 7–18)
CALCIUM SERPL-MCNC: 8.6 MG/DL (ref 8.5–10.1)
CHLORIDE SERPL-SCNC: 96 MMOL/L (ref 98–107)
CO2 SERPL-SCNC: 33.8 MMOL/L (ref 21–32)
CREAT SERPL-MCNC: 12.9 MG/DL (ref 0.7–1.3)
CRP SERPL-MCNC: 14.6 MG/DL (ref ?–0.9)
ERYTHROCYTE [DISTWIDTH] IN BLOOD BY AUTOMATED COUNT: 14.5 % (ref 11.5–14.5)
GFR SERPLBLD BASED ON 1.73 SQ M-ARVRAT: 4 ML/MIN
GLUCOSE SERPL-MCNC: 83 MG/DL (ref 74–106)
PLATELET # BLD: 247 X10^3MCL (ref 130–400)
POTASSIUM SERPL-SCNC: 4.7 MMOL/L (ref 3.5–5.1)
SODIUM SERPL-SCNC: 138 MMOL/L (ref 136–145)

## 2020-08-20 PROCEDURE — 5A1D70Z PERFORMANCE OF URINARY FILTRATION, INTERMITTENT, LESS THAN 6 HOURS PER DAY: ICD-10-PCS

## 2020-08-21 VITALS — SYSTOLIC BLOOD PRESSURE: 142 MMHG | DIASTOLIC BLOOD PRESSURE: 89 MMHG

## 2020-08-21 VITALS — SYSTOLIC BLOOD PRESSURE: 148 MMHG | DIASTOLIC BLOOD PRESSURE: 92 MMHG

## 2020-08-21 VITALS — DIASTOLIC BLOOD PRESSURE: 89 MMHG | SYSTOLIC BLOOD PRESSURE: 142 MMHG

## 2020-08-21 VITALS — DIASTOLIC BLOOD PRESSURE: 90 MMHG | SYSTOLIC BLOOD PRESSURE: 151 MMHG

## 2020-08-21 LAB
BASOPHILS NFR BLD: 0.4 % (ref 0–2)
BUN SERPL-MCNC: 46 MG/DL (ref 7–18)
CALCIUM SERPL-MCNC: 9.1 MG/DL (ref 8.5–10.1)
CHLORIDE SERPL-SCNC: 99 MMOL/L (ref 98–107)
CO2 SERPL-SCNC: 26.5 MMOL/L (ref 21–32)
CREAT SERPL-MCNC: 9.4 MG/DL (ref 0.7–1.3)
ERYTHROCYTE [DISTWIDTH] IN BLOOD BY AUTOMATED COUNT: 14.9 % (ref 11.5–14.5)
GFR SERPLBLD BASED ON 1.73 SQ M-ARVRAT: 6 ML/MIN
GLUCOSE SERPL-MCNC: 87 MG/DL (ref 74–106)
MAGNESIUM SERPL-MCNC: 2 MG/DL (ref 1.8–2.4)
PLATELET # BLD: 259 X10^3MCL (ref 130–400)
POTASSIUM SERPL-SCNC: 5.1 MMOL/L (ref 3.5–5.1)
SODIUM SERPL-SCNC: 138 MMOL/L (ref 136–145)

## 2020-09-02 NOTE — NUR
CHECKLIST INITIATED. I was not aware a call back was being awaited but did plan to call mother back myself to receive information later today before our appt.  Nursing pls contact mother to determind if she would rather give information to nursing staff.  Also if possible, pls ask where she received her medical care previously so that we would possibly be able to use Care Everywhere or request records.

## 2020-12-21 ENCOUNTER — HOSPITAL ENCOUNTER (INPATIENT)
Dept: HOSPITAL 1 - ED | Age: 50
LOS: 6 days | Discharge: HOME HEALTH | DRG: 710 | End: 2020-12-27
Payer: COMMERCIAL

## 2020-12-21 VITALS — DIASTOLIC BLOOD PRESSURE: 77 MMHG | SYSTOLIC BLOOD PRESSURE: 121 MMHG

## 2020-12-21 VITALS
BODY MASS INDEX: 30.06 KG/M2 | BODY MASS INDEX: 30.06 KG/M2 | HEIGHT: 67 IN | WEIGHT: 191.5 LBS | HEIGHT: 67 IN | WEIGHT: 191.5 LBS

## 2020-12-21 DIAGNOSIS — Z20.828: ICD-10-CM

## 2020-12-21 DIAGNOSIS — N18.6: ICD-10-CM

## 2020-12-21 DIAGNOSIS — I12.0: ICD-10-CM

## 2020-12-21 DIAGNOSIS — M00.9: ICD-10-CM

## 2020-12-21 DIAGNOSIS — D63.1: ICD-10-CM

## 2020-12-21 DIAGNOSIS — A41.9: Primary | ICD-10-CM

## 2020-12-21 DIAGNOSIS — E87.1: ICD-10-CM

## 2020-12-21 DIAGNOSIS — Z99.2: ICD-10-CM

## 2020-12-21 DIAGNOSIS — E21.3: ICD-10-CM

## 2020-12-21 DIAGNOSIS — D72.829: ICD-10-CM

## 2020-12-21 LAB
APPEARANCE SPUN FLD: (no result)
BASOPHILS NFR BLD: 0.6 % (ref 0.2–1.5)
BODY FLD TYPE: (no result)
BUN SERPL-MCNC: 33 MG/DL (ref 7–18)
CALCIUM SERPL-MCNC: 8.5 MG/DL (ref 8.5–10.1)
CHLORIDE SERPL-SCNC: 92 MMOL/L (ref 98–107)
CO2 SERPL-SCNC: 29.5 MMOL/L (ref 21–32)
COLOR FLD: YELLOW
CREAT SERPL-MCNC: 7.5 MG/DL (ref 0.7–1.3)
ERYTHROCYTE [DISTWIDTH] IN BLOOD BY AUTOMATED COUNT: 16.1 % (ref 12.1–16.2)
GFR SERPLBLD BASED ON 1.73 SQ M-ARVRAT: 8 ML/MIN
GLUCOSE SERPL-MCNC: 97 MG/DL (ref 74–106)
LYMPHOCYTES NFR FLD MANUAL: 17 %
MONOCYTES NFR FLD MANUAL: 5 %
PLATELET # BLD: 298 X10^3MCL (ref 152–348)
POTASSIUM SERPL-SCNC: 4.7 MMOL/L (ref 3.5–5.1)
RBC # FLD MANUAL: (no result) /CUMM
SODIUM SERPL-SCNC: 133 MMOL/L (ref 136–145)
VACCINATION BODY SITE: (no result)
WBC # FLD MANUAL: (no result) /CUMM

## 2020-12-21 PROCEDURE — G0378 HOSPITAL OBSERVATION PER HR: HCPCS

## 2020-12-21 PROCEDURE — 0MB24ZZ EXCISION OF LEFT SHOULDER BURSA AND LIGAMENT, PERCUTANEOUS ENDOSCOPIC APPROACH: ICD-10-PCS | Performed by: ORTHOPAEDIC SURGERY

## 2020-12-21 PROCEDURE — 0PBB4ZZ EXCISION OF LEFT CLAVICLE, PERCUTANEOUS ENDOSCOPIC APPROACH: ICD-10-PCS | Performed by: ORTHOPAEDIC SURGERY

## 2020-12-21 PROCEDURE — 0RNK4ZZ RELEASE LEFT SHOULDER JOINT, PERCUTANEOUS ENDOSCOPIC APPROACH: ICD-10-PCS | Performed by: ORTHOPAEDIC SURGERY

## 2020-12-21 PROCEDURE — 0RBK4ZX EXCISION OF LEFT SHOULDER JOINT, PERCUTANEOUS ENDOSCOPIC APPROACH, DIAGNOSTIC: ICD-10-PCS | Performed by: ORTHOPAEDIC SURGERY

## 2020-12-22 VITALS — SYSTOLIC BLOOD PRESSURE: 115 MMHG | DIASTOLIC BLOOD PRESSURE: 74 MMHG

## 2020-12-22 VITALS — SYSTOLIC BLOOD PRESSURE: 123 MMHG | DIASTOLIC BLOOD PRESSURE: 67 MMHG

## 2020-12-22 VITALS — SYSTOLIC BLOOD PRESSURE: 87 MMHG | DIASTOLIC BLOOD PRESSURE: 58 MMHG

## 2020-12-22 VITALS — DIASTOLIC BLOOD PRESSURE: 70 MMHG | SYSTOLIC BLOOD PRESSURE: 115 MMHG

## 2020-12-22 VITALS — DIASTOLIC BLOOD PRESSURE: 77 MMHG | SYSTOLIC BLOOD PRESSURE: 126 MMHG

## 2020-12-22 LAB
ALBUMIN SERPL-MCNC: 2.4 G/DL (ref 3.4–5)
ALP SERPL-CCNC: 46 U/L (ref 46–116)
ALT SERPL-CCNC: 11 U/L (ref 16–63)
AST SERPL-CCNC: 5 U/L (ref 15–37)
BASOPHILS NFR BLD: 0.1 % (ref 0.2–1.5)
BILIRUB SERPL-MCNC: 0.29 MG/DL (ref 0.2–1)
BUN SERPL-MCNC: 52 MG/DL (ref 7–18)
CALCIUM SERPL-MCNC: 7.7 MG/DL (ref 8.5–10.1)
CHLORIDE SERPL-SCNC: 95 MMOL/L (ref 98–107)
CO2 SERPL-SCNC: 23.3 MMOL/L (ref 21–32)
CREAT SERPL-MCNC: 9.5 MG/DL (ref 0.7–1.3)
ERYTHROCYTE [DISTWIDTH] IN BLOOD BY AUTOMATED COUNT: 16.5 % (ref 12.1–16.2)
GFR SERPLBLD BASED ON 1.73 SQ M-ARVRAT: 6 ML/MIN
GLUCOSE SERPL-MCNC: 99 MG/DL (ref 74–106)
MAGNESIUM SERPL-MCNC: 2.4 MG/DL (ref 1.8–2.4)
PLATELET # BLD: 308 X10^3MCL (ref 152–348)
POTASSIUM SERPL-SCNC: 6.3 MMOL/L (ref 3.5–5.1)
PROT SERPL-MCNC: 7.9 G/DL (ref 6.4–8.2)
SODIUM SERPL-SCNC: 133 MMOL/L (ref 136–145)

## 2020-12-23 VITALS — SYSTOLIC BLOOD PRESSURE: 126 MMHG | DIASTOLIC BLOOD PRESSURE: 81 MMHG

## 2020-12-23 VITALS — SYSTOLIC BLOOD PRESSURE: 139 MMHG | DIASTOLIC BLOOD PRESSURE: 85 MMHG

## 2020-12-23 VITALS — DIASTOLIC BLOOD PRESSURE: 94 MMHG | SYSTOLIC BLOOD PRESSURE: 162 MMHG

## 2020-12-23 VITALS — SYSTOLIC BLOOD PRESSURE: 133 MMHG | DIASTOLIC BLOOD PRESSURE: 76 MMHG

## 2020-12-23 VITALS — DIASTOLIC BLOOD PRESSURE: 80 MMHG | SYSTOLIC BLOOD PRESSURE: 124 MMHG

## 2020-12-23 LAB
ALBUMIN SERPL-MCNC: 2.2 G/DL (ref 3.4–5)
ALP SERPL-CCNC: 48 U/L (ref 46–116)
ALT SERPL-CCNC: 5 U/L (ref 16–63)
AST SERPL-CCNC: 9 U/L (ref 15–37)
BASOPHILS NFR BLD: 0.8 % (ref 0.2–1.5)
BILIRUB SERPL-MCNC: 0.3 MG/DL (ref 0.2–1)
BUN SERPL-MCNC: 77 MG/DL (ref 7–18)
CALCIUM SERPL-MCNC: 7.3 MG/DL (ref 8.5–10.1)
CHLORIDE SERPL-SCNC: 91 MMOL/L (ref 98–107)
CO2 SERPL-SCNC: 24 MMOL/L (ref 21–32)
CREAT SERPL-MCNC: 11.9 MG/DL (ref 0.7–1.3)
ERYTHROCYTE [DISTWIDTH] IN BLOOD BY AUTOMATED COUNT: 16.6 % (ref 12.1–16.2)
GFR SERPLBLD BASED ON 1.73 SQ M-ARVRAT: 5 ML/MIN
GLUCOSE SERPL-MCNC: 84 MG/DL (ref 74–106)
MAGNESIUM SERPL-MCNC: 2.5 MG/DL (ref 1.8–2.4)
PLATELET # BLD: 342 X10^3MCL (ref 152–348)
POTASSIUM SERPL-SCNC: 4.6 MMOL/L (ref 3.5–5.1)
PROT SERPL-MCNC: 7.2 G/DL (ref 6.4–8.2)
SODIUM SERPL-SCNC: 133 MMOL/L (ref 136–145)

## 2020-12-23 PROCEDURE — 5A1D70Z PERFORMANCE OF URINARY FILTRATION, INTERMITTENT, LESS THAN 6 HOURS PER DAY: ICD-10-PCS | Performed by: INTERNAL MEDICINE

## 2020-12-24 VITALS — DIASTOLIC BLOOD PRESSURE: 92 MMHG | SYSTOLIC BLOOD PRESSURE: 139 MMHG

## 2020-12-24 VITALS — SYSTOLIC BLOOD PRESSURE: 160 MMHG | DIASTOLIC BLOOD PRESSURE: 94 MMHG

## 2020-12-24 VITALS — DIASTOLIC BLOOD PRESSURE: 97 MMHG | SYSTOLIC BLOOD PRESSURE: 148 MMHG

## 2020-12-24 VITALS — SYSTOLIC BLOOD PRESSURE: 144 MMHG | DIASTOLIC BLOOD PRESSURE: 87 MMHG

## 2020-12-24 VITALS — SYSTOLIC BLOOD PRESSURE: 155 MMHG | DIASTOLIC BLOOD PRESSURE: 99 MMHG

## 2020-12-24 LAB
ALBUMIN SERPL-MCNC: 2.4 G/DL (ref 3.4–5)
ALP SERPL-CCNC: 62 U/L (ref 46–116)
ALT SERPL-CCNC: 11 U/L (ref 16–63)
AST SERPL-CCNC: 15 U/L (ref 15–37)
BASOPHILS NFR BLD: 0.7 % (ref 0.2–1.5)
BILIRUB SERPL-MCNC: 0.53 MG/DL (ref 0.2–1)
BUN SERPL-MCNC: 48 MG/DL (ref 7–18)
CALCIUM SERPL-MCNC: 8.6 MG/DL (ref 8.5–10.1)
CHLORIDE SERPL-SCNC: 97 MMOL/L (ref 98–107)
CO2 SERPL-SCNC: 28.6 MMOL/L (ref 21–32)
CREAT SERPL-MCNC: 8.7 MG/DL (ref 0.7–1.3)
ERYTHROCYTE [DISTWIDTH] IN BLOOD BY AUTOMATED COUNT: 17 % (ref 12.1–16.2)
GFR SERPLBLD BASED ON 1.73 SQ M-ARVRAT: 7 ML/MIN
GLUCOSE SERPL-MCNC: 76 MG/DL (ref 74–106)
MAGNESIUM SERPL-MCNC: 2.4 MG/DL (ref 1.8–2.4)
PHOSPHATE SERPL-MCNC: 5.8 MG/DL (ref 2.5–4.9)
PLATELET # BLD: 371 X10^3MCL (ref 152–348)
POTASSIUM SERPL-SCNC: 4.4 MMOL/L (ref 3.5–5.1)
PROT SERPL-MCNC: 8.2 G/DL (ref 6.4–8.2)
SODIUM SERPL-SCNC: 139 MMOL/L (ref 136–145)

## 2020-12-25 VITALS — DIASTOLIC BLOOD PRESSURE: 91 MMHG | SYSTOLIC BLOOD PRESSURE: 148 MMHG

## 2020-12-25 VITALS — SYSTOLIC BLOOD PRESSURE: 160 MMHG | DIASTOLIC BLOOD PRESSURE: 93 MMHG

## 2020-12-25 VITALS — SYSTOLIC BLOOD PRESSURE: 156 MMHG | DIASTOLIC BLOOD PRESSURE: 94 MMHG

## 2020-12-25 VITALS — SYSTOLIC BLOOD PRESSURE: 140 MMHG | DIASTOLIC BLOOD PRESSURE: 85 MMHG

## 2020-12-25 VITALS — SYSTOLIC BLOOD PRESSURE: 144 MMHG | DIASTOLIC BLOOD PRESSURE: 90 MMHG

## 2020-12-25 LAB
ALBUMIN SERPL-MCNC: 2.5 G/DL (ref 3.4–5)
ALP SERPL-CCNC: 59 U/L (ref 46–116)
ALT SERPL-CCNC: 12 U/L (ref 16–63)
AST SERPL-CCNC: 16 U/L (ref 15–37)
BASOPHILS NFR BLD: 0.7 % (ref 0.2–1.5)
BILIRUB SERPL-MCNC: 0.3 MG/DL (ref 0.2–1)
BUN SERPL-MCNC: 66 MG/DL (ref 7–18)
CALCIUM SERPL-MCNC: 8.3 MG/DL (ref 8.5–10.1)
CHLORIDE SERPL-SCNC: 93 MMOL/L (ref 98–107)
CO2 SERPL-SCNC: 22.2 MMOL/L (ref 21–32)
CREAT SERPL-MCNC: 11.2 MG/DL (ref 0.7–1.3)
ERYTHROCYTE [DISTWIDTH] IN BLOOD BY AUTOMATED COUNT: 16.7 % (ref 12.1–16.2)
GFR SERPLBLD BASED ON 1.73 SQ M-ARVRAT: 5 ML/MIN
GLUCOSE SERPL-MCNC: 74 MG/DL (ref 74–106)
MAGNESIUM SERPL-MCNC: 2.4 MG/DL (ref 1.8–2.4)
PLATELET # BLD: 403 X10^3MCL (ref 152–348)
POTASSIUM SERPL-SCNC: 4.6 MMOL/L (ref 3.5–5.1)
PROT SERPL-MCNC: 8.4 G/DL (ref 6.4–8.2)
SODIUM SERPL-SCNC: 135 MMOL/L (ref 136–145)

## 2020-12-25 PROCEDURE — 5A1D70Z PERFORMANCE OF URINARY FILTRATION, INTERMITTENT, LESS THAN 6 HOURS PER DAY: ICD-10-PCS | Performed by: INTERNAL MEDICINE

## 2020-12-26 VITALS — SYSTOLIC BLOOD PRESSURE: 141 MMHG | DIASTOLIC BLOOD PRESSURE: 89 MMHG

## 2020-12-26 VITALS — DIASTOLIC BLOOD PRESSURE: 80 MMHG | SYSTOLIC BLOOD PRESSURE: 119 MMHG

## 2020-12-26 VITALS — DIASTOLIC BLOOD PRESSURE: 94 MMHG | SYSTOLIC BLOOD PRESSURE: 142 MMHG

## 2020-12-26 VITALS — SYSTOLIC BLOOD PRESSURE: 136 MMHG | DIASTOLIC BLOOD PRESSURE: 81 MMHG

## 2020-12-26 VITALS — SYSTOLIC BLOOD PRESSURE: 115 MMHG | DIASTOLIC BLOOD PRESSURE: 75 MMHG

## 2020-12-26 LAB
ALBUMIN SERPL-MCNC: 2.2 G/DL (ref 3.4–5)
ALP SERPL-CCNC: 56 U/L (ref 46–116)
ALT SERPL-CCNC: 9 U/L (ref 16–63)
AST SERPL-CCNC: 15 U/L (ref 15–37)
BASOPHILS NFR BLD: 0.6 % (ref 0.2–1.5)
BILIRUB SERPL-MCNC: 0.2 MG/DL (ref 0.2–1)
BUN SERPL-MCNC: 48 MG/DL (ref 7–18)
CALCIUM SERPL-MCNC: 8.3 MG/DL (ref 8.5–10.1)
CHLORIDE SERPL-SCNC: 99 MMOL/L (ref 98–107)
CO2 SERPL-SCNC: 28.4 MMOL/L (ref 21–32)
CREAT SERPL-MCNC: 9 MG/DL (ref 0.7–1.3)
ERYTHROCYTE [DISTWIDTH] IN BLOOD BY AUTOMATED COUNT: 16.8 % (ref 12.1–16.2)
GFR SERPLBLD BASED ON 1.73 SQ M-ARVRAT: 7 ML/MIN
GLUCOSE SERPL-MCNC: 79 MG/DL (ref 74–106)
MAGNESIUM SERPL-MCNC: 2.2 MG/DL (ref 1.8–2.4)
PLATELET # BLD: 415 X10^3MCL (ref 152–348)
POTASSIUM SERPL-SCNC: 4.6 MMOL/L (ref 3.5–5.1)
PROT SERPL-MCNC: 7.5 G/DL (ref 6.4–8.2)
SODIUM SERPL-SCNC: 139 MMOL/L (ref 136–145)

## 2020-12-27 VITALS — DIASTOLIC BLOOD PRESSURE: 84 MMHG | SYSTOLIC BLOOD PRESSURE: 127 MMHG

## 2020-12-27 VITALS — SYSTOLIC BLOOD PRESSURE: 122 MMHG | DIASTOLIC BLOOD PRESSURE: 76 MMHG

## 2020-12-27 VITALS — SYSTOLIC BLOOD PRESSURE: 138 MMHG | DIASTOLIC BLOOD PRESSURE: 77 MMHG

## 2020-12-27 VITALS — DIASTOLIC BLOOD PRESSURE: 76 MMHG | SYSTOLIC BLOOD PRESSURE: 122 MMHG

## 2021-06-19 NOTE — NUR
HEMODIALYSIS IN PROGRESS. VSS. BP WNL. MONITOR SHOWING SINUS RHYTHM; RATE 97.
PATIENT ON ROOM AIR AND TOLERATED WELL. 2 seconds or less

## 2022-04-19 NOTE — NUR
AT 1512 - MEDICATED WITH TYLANOL AND BENADRY AS PER EMAR PRIOR TO COMMENCEMENT
OF BLOOD TRANSFUSION.
AT 1537 - COMMENCED TRANSFUSION OF 1 UNIT PRBC. Statement Selected

## 2022-10-12 NOTE — NUR
DR. FELIZ HERE AND SAW PT. DR TRINI RAY ISOLATION AND WANTS JERILYN ORDER FOR
DR. GARCIA TO DO AND TO LET RESIDENT KNOW. AIRBORNE ISOLATION FLOR AND DR. BRADSHAW
MADE AWARE AND STATED WILL LET [employer] KNOW. From: Tomeka GONZALEZ  To: Constance Cardoso  Sent: 10/11/2022 4:11 PM CDT  Subject: ct scan    Hello    Your CT scan did show non obstructing kidney stones. Dr Whitney said this is likely the reason you had the blood in your urine. He does recommend for you to see Urology to see what they think should be done about the kidney stones.    Please let me know if I can place the urology referral for you.    Thank you  Tomeka BURROUGHS